# Patient Record
Sex: FEMALE | Race: WHITE | NOT HISPANIC OR LATINO | Employment: PART TIME | URBAN - METROPOLITAN AREA
[De-identification: names, ages, dates, MRNs, and addresses within clinical notes are randomized per-mention and may not be internally consistent; named-entity substitution may affect disease eponyms.]

---

## 2022-06-06 DIAGNOSIS — C50.919 MALIGNANT NEOPLASM OF BREAST IN FEMALE, ESTROGEN RECEPTOR POSITIVE, UNSPECIFIED LATERALITY, UNSPECIFIED SITE OF BREAST (MULTI): Primary | ICD-10-CM

## 2022-06-06 DIAGNOSIS — Z17.0 MALIGNANT NEOPLASM OF BREAST IN FEMALE, ESTROGEN RECEPTOR POSITIVE, UNSPECIFIED LATERALITY, UNSPECIFIED SITE OF BREAST (MULTI): Primary | ICD-10-CM

## 2023-03-20 LAB
ACTIVATED PARTIAL THROMBOPLASTIN TIME IN PPP BY COAGULATION ASSAY: 30 SEC (ref 26–39)
ALANINE AMINOTRANSFERASE (SGPT) (U/L) IN SER/PLAS: 14 U/L (ref 7–45)
ALBUMIN (G/DL) IN SER/PLAS: 4.5 G/DL (ref 3.4–5)
ALKALINE PHOSPHATASE (U/L) IN SER/PLAS: 71 U/L (ref 33–136)
ANION GAP IN SER/PLAS: 12 MMOL/L (ref 10–20)
ASPARTATE AMINOTRANSFERASE (SGOT) (U/L) IN SER/PLAS: 15 U/L (ref 9–39)
BASOPHILS (10*3/UL) IN BLOOD BY AUTOMATED COUNT: 0.03 X10E9/L (ref 0–0.1)
BASOPHILS/100 LEUKOCYTES IN BLOOD BY AUTOMATED COUNT: 0.6 % (ref 0–2)
BILIRUBIN TOTAL (MG/DL) IN SER/PLAS: 0.7 MG/DL (ref 0–1.2)
CALCIUM (MG/DL) IN SER/PLAS: 10.1 MG/DL (ref 8.6–10.6)
CARBON DIOXIDE, TOTAL (MMOL/L) IN SER/PLAS: 29 MMOL/L (ref 21–32)
CHLORIDE (MMOL/L) IN SER/PLAS: 101 MMOL/L (ref 98–107)
CREATININE (MG/DL) IN SER/PLAS: 0.62 MG/DL (ref 0.5–1.05)
EOSINOPHILS (10*3/UL) IN BLOOD BY AUTOMATED COUNT: 0.07 X10E9/L (ref 0–0.7)
EOSINOPHILS/100 LEUKOCYTES IN BLOOD BY AUTOMATED COUNT: 1.5 % (ref 0–6)
ERYTHROCYTE DISTRIBUTION WIDTH (RATIO) BY AUTOMATED COUNT: 13.3 % (ref 11.5–14.5)
ERYTHROCYTE MEAN CORPUSCULAR HEMOGLOBIN CONCENTRATION (G/DL) BY AUTOMATED: 33.8 G/DL (ref 32–36)
ERYTHROCYTE MEAN CORPUSCULAR VOLUME (FL) BY AUTOMATED COUNT: 100 FL (ref 80–100)
ERYTHROCYTES (10*6/UL) IN BLOOD BY AUTOMATED COUNT: 4.01 X10E12/L (ref 4–5.2)
GFR FEMALE: >90 ML/MIN/1.73M2
GLUCOSE (MG/DL) IN SER/PLAS: 136 MG/DL (ref 74–99)
HEMATOCRIT (%) IN BLOOD BY AUTOMATED COUNT: 40.2 % (ref 36–46)
HEMOGLOBIN (G/DL) IN BLOOD: 13.6 G/DL (ref 12–16)
IMMATURE GRANULOCYTES/100 LEUKOCYTES IN BLOOD BY AUTOMATED COUNT: 0 % (ref 0–0.9)
INR IN PPP BY COAGULATION ASSAY: 1 (ref 0.9–1.1)
LEUKOCYTES (10*3/UL) IN BLOOD BY AUTOMATED COUNT: 4.8 X10E9/L (ref 4.4–11.3)
LYMPHOCYTES (10*3/UL) IN BLOOD BY AUTOMATED COUNT: 0.77 X10E9/L (ref 1.2–4.8)
LYMPHOCYTES/100 LEUKOCYTES IN BLOOD BY AUTOMATED COUNT: 16 % (ref 13–44)
MONOCYTES (10*3/UL) IN BLOOD BY AUTOMATED COUNT: 0.34 X10E9/L (ref 0.1–1)
MONOCYTES/100 LEUKOCYTES IN BLOOD BY AUTOMATED COUNT: 7.1 % (ref 2–10)
NEUTROPHILS (10*3/UL) IN BLOOD BY AUTOMATED COUNT: 3.59 X10E9/L (ref 1.2–7.7)
NEUTROPHILS/100 LEUKOCYTES IN BLOOD BY AUTOMATED COUNT: 74.8 % (ref 40–80)
PLATELETS (10*3/UL) IN BLOOD AUTOMATED COUNT: 162 X10E9/L (ref 150–450)
POTASSIUM (MMOL/L) IN SER/PLAS: 3.8 MMOL/L (ref 3.5–5.3)
PROTEIN TOTAL: 6.9 G/DL (ref 6.4–8.2)
PROTHROMBIN TIME (PT) IN PPP BY COAGULATION ASSAY: 11.7 SEC (ref 9.8–13.4)
SODIUM (MMOL/L) IN SER/PLAS: 138 MMOL/L (ref 136–145)
UREA NITROGEN (MG/DL) IN SER/PLAS: 8 MG/DL (ref 6–23)

## 2023-09-12 LAB
ALANINE AMINOTRANSFERASE (SGPT) (U/L) IN SER/PLAS: 15 U/L (ref 7–45)
ALBUMIN (G/DL) IN SER/PLAS: 4.7 G/DL (ref 3.4–5)
ALKALINE PHOSPHATASE (U/L) IN SER/PLAS: 69 U/L (ref 33–136)
ANION GAP IN SER/PLAS: 16 MMOL/L (ref 10–20)
ASPARTATE AMINOTRANSFERASE (SGOT) (U/L) IN SER/PLAS: 17 U/L (ref 9–39)
BASOPHILS (10*3/UL) IN BLOOD BY AUTOMATED COUNT: 0.04 X10E9/L (ref 0–0.1)
BASOPHILS/100 LEUKOCYTES IN BLOOD BY AUTOMATED COUNT: 0.8 % (ref 0–2)
BILIRUBIN TOTAL (MG/DL) IN SER/PLAS: 0.5 MG/DL (ref 0–1.2)
CALCIUM (MG/DL) IN SER/PLAS: 9.9 MG/DL (ref 8.6–10.6)
CARBON DIOXIDE, TOTAL (MMOL/L) IN SER/PLAS: 28 MMOL/L (ref 21–32)
CHLORIDE (MMOL/L) IN SER/PLAS: 104 MMOL/L (ref 98–107)
CREATININE (MG/DL) IN SER/PLAS: 0.55 MG/DL (ref 0.5–1.05)
EOSINOPHILS (10*3/UL) IN BLOOD BY AUTOMATED COUNT: 0.09 X10E9/L (ref 0–0.7)
EOSINOPHILS/100 LEUKOCYTES IN BLOOD BY AUTOMATED COUNT: 1.9 % (ref 0–6)
ERYTHROCYTE DISTRIBUTION WIDTH (RATIO) BY AUTOMATED COUNT: 12.4 % (ref 11.5–14.5)
ERYTHROCYTE MEAN CORPUSCULAR HEMOGLOBIN CONCENTRATION (G/DL) BY AUTOMATED: 32.5 G/DL (ref 32–36)
ERYTHROCYTE MEAN CORPUSCULAR VOLUME (FL) BY AUTOMATED COUNT: 105 FL (ref 80–100)
ERYTHROCYTES (10*6/UL) IN BLOOD BY AUTOMATED COUNT: 4.33 X10E12/L (ref 4–5.2)
GFR FEMALE: >90 ML/MIN/1.73M2
GLUCOSE (MG/DL) IN SER/PLAS: 50 MG/DL (ref 74–99)
HEMATOCRIT (%) IN BLOOD BY AUTOMATED COUNT: 45.5 % (ref 36–46)
HEMOGLOBIN (G/DL) IN BLOOD: 14.8 G/DL (ref 12–16)
IMMATURE GRANULOCYTES/100 LEUKOCYTES IN BLOOD BY AUTOMATED COUNT: 0 % (ref 0–0.9)
LEUKOCYTES (10*3/UL) IN BLOOD BY AUTOMATED COUNT: 4.9 X10E9/L (ref 4.4–11.3)
LYMPHOCYTES (10*3/UL) IN BLOOD BY AUTOMATED COUNT: 1.29 X10E9/L (ref 1.2–4.8)
LYMPHOCYTES/100 LEUKOCYTES IN BLOOD BY AUTOMATED COUNT: 26.5 % (ref 13–44)
MONOCYTES (10*3/UL) IN BLOOD BY AUTOMATED COUNT: 0.35 X10E9/L (ref 0.1–1)
MONOCYTES/100 LEUKOCYTES IN BLOOD BY AUTOMATED COUNT: 7.2 % (ref 2–10)
NEUTROPHILS (10*3/UL) IN BLOOD BY AUTOMATED COUNT: 3.09 X10E9/L (ref 1.2–7.7)
NEUTROPHILS/100 LEUKOCYTES IN BLOOD BY AUTOMATED COUNT: 63.6 % (ref 40–80)
NRBC (PER 100 WBCS) BY AUTOMATED COUNT: 0 /100 WBC (ref 0–0)
PLATELETS (10*3/UL) IN BLOOD AUTOMATED COUNT: 218 X10E9/L (ref 150–450)
POTASSIUM (MMOL/L) IN SER/PLAS: 4.4 MMOL/L (ref 3.5–5.3)
PROTEIN TOTAL: 7.3 G/DL (ref 6.4–8.2)
SODIUM (MMOL/L) IN SER/PLAS: 144 MMOL/L (ref 136–145)
UREA NITROGEN (MG/DL) IN SER/PLAS: 14 MG/DL (ref 6–23)

## 2023-09-26 VITALS — BODY MASS INDEX: 22.58 KG/M2 | WEIGHT: 132.28 LBS | HEIGHT: 64 IN

## 2023-09-26 DIAGNOSIS — Z17.0 MALIGNANT NEOPLASM OF BREAST IN FEMALE, ESTROGEN RECEPTOR POSITIVE, UNSPECIFIED LATERALITY, UNSPECIFIED SITE OF BREAST (MULTI): Primary | ICD-10-CM

## 2023-09-26 DIAGNOSIS — C50.919 MALIGNANT NEOPLASM OF BREAST IN FEMALE, ESTROGEN RECEPTOR POSITIVE, UNSPECIFIED LATERALITY, UNSPECIFIED SITE OF BREAST (MULTI): Primary | ICD-10-CM

## 2023-09-26 RX ORDER — HEPARIN SODIUM,PORCINE/PF 10 UNIT/ML
50 SYRINGE (ML) INTRAVENOUS AS NEEDED
OUTPATIENT
Start: 2023-09-30

## 2023-09-26 RX ORDER — HEPARIN 100 UNIT/ML
500 SYRINGE INTRAVENOUS AS NEEDED
OUTPATIENT
Start: 2023-09-30

## 2023-11-09 ENCOUNTER — NURSE TRIAGE (OUTPATIENT)
Dept: ADMISSION | Facility: HOSPITAL | Age: 63
End: 2023-11-09
Payer: MEDICARE

## 2023-11-09 NOTE — TELEPHONE ENCOUNTER
Patient c/o lightheadedness/wooziness (not dizziness per patient) that occurs maybe 3-4 times a week for the past couple of weeks.   She notices it when she is up doing some sort of activity. She is an active person. States this feeling is mild but she wanted to report it.   Patient is on a clinical trial; she takes oral Giredestrant. She has been taking this for about a year. She has not had any dizziness in the past.   Next FUV 12/11.     States her glucose in September was 50 after eating. Her PCP advised her to just notify her of her next level; she did not do any further workup/provide any intervention. She has not had labs since. She currently denies any signs of hypoglycemia and has never been diagnosed with diabetes. She has been diligent eating fruit/trying to maintain sugar in her diet. She also drinks a lot of water per patient. No issues eating or drinking/no weight loss.   Additional Information   Commented on: Do you/patient have any of these signs of a stroke?     Denies any of these   Commented on: How long have you been having this problem?     At least 2-3 weeks    Protocols used: Dizziness

## 2023-11-10 NOTE — TELEPHONE ENCOUNTER
After reviewing with Carolyn Bishop, I advised Eloina to contact her PCP to further work up since she has had issues with low blood sugars. Patient verbalized understanding and has no further questions or concerns at this time.

## 2023-11-30 ENCOUNTER — LAB (OUTPATIENT)
Dept: LAB | Facility: CLINIC | Age: 63
End: 2023-11-30
Payer: COMMERCIAL

## 2023-11-30 DIAGNOSIS — C50.919 MALIGNANT NEOPLASM OF BREAST IN FEMALE, ESTROGEN RECEPTOR POSITIVE, UNSPECIFIED LATERALITY, UNSPECIFIED SITE OF BREAST (MULTI): ICD-10-CM

## 2023-11-30 DIAGNOSIS — Z17.0 MALIGNANT NEOPLASM OF BREAST IN FEMALE, ESTROGEN RECEPTOR POSITIVE, UNSPECIFIED LATERALITY, UNSPECIFIED SITE OF BREAST (MULTI): ICD-10-CM

## 2023-11-30 LAB
BASOPHILS # BLD AUTO: 0.03 X10*3/UL (ref 0–0.1)
BASOPHILS NFR BLD AUTO: 0.8 %
EOSINOPHIL # BLD AUTO: 0.05 X10*3/UL (ref 0–0.7)
EOSINOPHIL NFR BLD AUTO: 1.3 %
ERYTHROCYTE [DISTWIDTH] IN BLOOD BY AUTOMATED COUNT: 12.9 % (ref 11.5–14.5)
HCT VFR BLD AUTO: 43.9 % (ref 36–46)
HGB BLD-MCNC: 14.7 G/DL (ref 12–16)
IMM GRANULOCYTES # BLD AUTO: 0 X10*3/UL (ref 0–0.7)
IMM GRANULOCYTES NFR BLD AUTO: 0 % (ref 0–0.9)
LYMPHOCYTES # BLD AUTO: 1.12 X10*3/UL (ref 1.2–4.8)
LYMPHOCYTES NFR BLD AUTO: 28.8 %
MCH RBC QN AUTO: 33.2 PG (ref 26–34)
MCHC RBC AUTO-ENTMCNC: 33.5 G/DL (ref 32–36)
MCV RBC AUTO: 99 FL (ref 80–100)
MONOCYTES # BLD AUTO: 0.33 X10*3/UL (ref 0.1–1)
MONOCYTES NFR BLD AUTO: 8.5 %
NEUTROPHILS # BLD AUTO: 2.36 X10*3/UL (ref 1.2–7.7)
NEUTROPHILS NFR BLD AUTO: 60.6 %
NRBC BLD-RTO: ABNORMAL /100{WBCS}
PLATELET # BLD AUTO: 219 X10*3/UL (ref 150–450)
RBC # BLD AUTO: 4.43 X10*6/UL (ref 4–5.2)
WBC # BLD AUTO: 3.9 X10*3/UL (ref 4.4–11.3)

## 2023-11-30 PROCEDURE — 36415 COLL VENOUS BLD VENIPUNCTURE: CPT

## 2023-11-30 PROCEDURE — 80053 COMPREHEN METABOLIC PANEL: CPT

## 2023-11-30 PROCEDURE — 85025 COMPLETE CBC W/AUTO DIFF WBC: CPT

## 2023-12-01 LAB
ALBUMIN SERPL BCP-MCNC: 4.7 G/DL (ref 3.4–5)
ALP SERPL-CCNC: 64 U/L (ref 33–136)
ALT SERPL W P-5'-P-CCNC: 19 U/L (ref 7–45)
ANION GAP SERPL CALC-SCNC: 12 MMOL/L (ref 10–20)
AST SERPL W P-5'-P-CCNC: 16 U/L (ref 9–39)
BILIRUB SERPL-MCNC: 0.7 MG/DL (ref 0–1.2)
BUN SERPL-MCNC: 10 MG/DL (ref 6–23)
CALCIUM SERPL-MCNC: 9.8 MG/DL (ref 8.6–10.6)
CHLORIDE SERPL-SCNC: 102 MMOL/L (ref 98–107)
CO2 SERPL-SCNC: 29 MMOL/L (ref 21–32)
CREAT SERPL-MCNC: 0.64 MG/DL (ref 0.5–1.05)
GFR SERPL CREATININE-BSD FRML MDRD: >90 ML/MIN/1.73M*2
GLUCOSE SERPL-MCNC: 62 MG/DL (ref 74–99)
POTASSIUM SERPL-SCNC: 4.1 MMOL/L (ref 3.5–5.3)
PROT SERPL-MCNC: 7.1 G/DL (ref 6.4–8.2)
SODIUM SERPL-SCNC: 139 MMOL/L (ref 136–145)

## 2023-12-04 PROBLEM — R00.2 PALPITATION: Status: ACTIVE | Noted: 2017-02-24

## 2023-12-04 PROBLEM — Z86.19 HISTORY OF HPV INFECTION: Status: ACTIVE | Noted: 2017-08-17

## 2023-12-04 PROBLEM — M81.0 OSTEOPOROSIS: Status: ACTIVE | Noted: 2023-09-07

## 2023-12-04 PROBLEM — Z98.890 S/P ABLATION OF ATRIAL FLUTTER: Status: ACTIVE | Noted: 2017-02-24

## 2023-12-04 PROBLEM — C50.911 MALIGNANT NEOPLASM OF UNSPECIFIED SITE OF RIGHT FEMALE BREAST (MULTI): Status: ACTIVE | Noted: 2023-12-04

## 2023-12-04 PROBLEM — C44.619 BASAL CELL CARCINOMA (BCC) OF SKIN OF LEFT UPPER EXTREMITY INCLUDING SHOULDER: Status: ACTIVE | Noted: 2021-05-13

## 2023-12-04 PROBLEM — N60.19 DIFFUSE CYSTIC MASTOPATHY: Status: ACTIVE | Noted: 2023-12-04

## 2023-12-04 PROBLEM — C50.911: Status: ACTIVE | Noted: 2023-12-04

## 2023-12-04 PROBLEM — M54.50 LUMBAGO: Status: ACTIVE | Noted: 2023-12-04

## 2023-12-04 PROBLEM — Z86.79 S/P ABLATION OF ATRIAL FLUTTER: Status: ACTIVE | Noted: 2017-02-24

## 2023-12-04 RX ORDER — CHOLECALCIFEROL (VITAMIN D3) 50 MCG
1 TABLET ORAL DAILY
COMMUNITY
End: 2023-12-11 | Stop reason: SDUPTHER

## 2023-12-04 RX ORDER — ERYTHROMYCIN 5 MG/G
OINTMENT OPHTHALMIC
COMMUNITY
Start: 2023-01-03 | End: 2023-12-11 | Stop reason: ALTCHOICE

## 2023-12-04 RX ORDER — MULTIVITAMIN WITH IRON
TABLET ORAL
COMMUNITY
Start: 2022-05-09 | End: 2024-01-12 | Stop reason: WASHOUT

## 2023-12-04 RX ORDER — ACETAMINOPHEN 500 MG
2000 TABLET ORAL
COMMUNITY

## 2023-12-04 RX ORDER — ZINC/VIT C/PYRIDOXINE (VIT B6) 12-60-0.5
LOZENGE ORAL
COMMUNITY
End: 2023-12-11 | Stop reason: SDUPTHER

## 2023-12-04 RX ORDER — EPINEPHRINE 0.22MG
AEROSOL WITH ADAPTER (ML) INHALATION
COMMUNITY
Start: 2022-05-09 | End: 2023-12-11 | Stop reason: ALTCHOICE

## 2023-12-04 RX ORDER — CALCIUM CARBONATE/VITAMIN D3 500 MG-10
TABLET,CHEWABLE ORAL
COMMUNITY
End: 2023-12-11 | Stop reason: SDUPTHER

## 2023-12-04 RX ORDER — MILK THISTLE FRUIT EXTRACT 140 MG
CAPSULE ORAL
COMMUNITY
Start: 2022-05-09 | End: 2023-12-11 | Stop reason: SDUPTHER

## 2023-12-04 RX ORDER — DEXTROMETHORPHAN HYDROBROMIDE, GUAIFENESIN 5; 100 MG/5ML; MG/5ML
650 LIQUID ORAL EVERY 8 HOURS PRN
COMMUNITY

## 2023-12-04 RX ORDER — BIOTIN 1 MG
1 TABLET ORAL DAILY
COMMUNITY

## 2023-12-04 RX ORDER — CALCIUM CARBONATE 1250 MG/5ML
SUSPENSION ORAL
COMMUNITY
End: 2024-01-12 | Stop reason: WASHOUT

## 2023-12-04 RX ORDER — MULTIVIT-MIN/IRON/FOLIC ACID/K 18-600-40
CAPSULE ORAL
COMMUNITY
End: 2023-12-11 | Stop reason: SDUPTHER

## 2023-12-04 RX ORDER — ZINC GLUCONATE 50 MG
TABLET ORAL
COMMUNITY
Start: 2022-05-09 | End: 2023-12-11 | Stop reason: SDUPTHER

## 2023-12-11 ENCOUNTER — APPOINTMENT (OUTPATIENT)
Dept: HEMATOLOGY/ONCOLOGY | Facility: CLINIC | Age: 63
End: 2023-12-11

## 2023-12-11 ENCOUNTER — OFFICE VISIT (OUTPATIENT)
Dept: HEMATOLOGY/ONCOLOGY | Facility: CLINIC | Age: 63
End: 2023-12-11
Payer: MEDICARE

## 2023-12-11 VITALS
SYSTOLIC BLOOD PRESSURE: 112 MMHG | BODY MASS INDEX: 21.45 KG/M2 | DIASTOLIC BLOOD PRESSURE: 69 MMHG | WEIGHT: 133.49 LBS | TEMPERATURE: 96.3 F | OXYGEN SATURATION: 99 % | RESPIRATION RATE: 12 BRPM | HEIGHT: 66 IN | HEART RATE: 65 BPM

## 2023-12-11 DIAGNOSIS — Z17.0 MALIGNANT NEOPLASM OF BREAST IN FEMALE, ESTROGEN RECEPTOR POSITIVE, UNSPECIFIED LATERALITY, UNSPECIFIED SITE OF BREAST (MULTI): ICD-10-CM

## 2023-12-11 DIAGNOSIS — C50.919 MALIGNANT NEOPLASM OF BREAST IN FEMALE, ESTROGEN RECEPTOR POSITIVE, UNSPECIFIED LATERALITY, UNSPECIFIED SITE OF BREAST (MULTI): ICD-10-CM

## 2023-12-11 PROCEDURE — 99215 OFFICE O/P EST HI 40 MIN: CPT | Performed by: STUDENT IN AN ORGANIZED HEALTH CARE EDUCATION/TRAINING PROGRAM

## 2023-12-11 PROCEDURE — 1159F MED LIST DOCD IN RCRD: CPT | Performed by: STUDENT IN AN ORGANIZED HEALTH CARE EDUCATION/TRAINING PROGRAM

## 2023-12-11 PROCEDURE — 1126F AMNT PAIN NOTED NONE PRSNT: CPT | Performed by: STUDENT IN AN ORGANIZED HEALTH CARE EDUCATION/TRAINING PROGRAM

## 2023-12-11 ASSESSMENT — ENCOUNTER SYMPTOMS
OCCASIONAL FEELINGS OF UNSTEADINESS: 0
DEPRESSION: 0

## 2023-12-11 ASSESSMENT — PATIENT HEALTH QUESTIONNAIRE - PHQ9
1. LITTLE INTEREST OR PLEASURE IN DOING THINGS: NOT AT ALL
2. FEELING DOWN, DEPRESSED OR HOPELESS: NOT AT ALL
SUM OF ALL RESPONSES TO PHQ9 QUESTIONS 1 & 2: 0

## 2023-12-11 ASSESSMENT — PAIN SCALES - GENERAL: PAINLEVEL: 0-NO PAIN

## 2023-12-11 ASSESSMENT — LIFESTYLE VARIABLES
HOW OFTEN DO YOU HAVE SIX OR MORE DRINKS ON ONE OCCASION: NEVER
AUDIT-C TOTAL SCORE: 1
HOW OFTEN DO YOU HAVE A DRINK CONTAINING ALCOHOL: MONTHLY OR LESS
SKIP TO QUESTIONS 9-10: 1
HOW MANY STANDARD DRINKS CONTAINING ALCOHOL DO YOU HAVE ON A TYPICAL DAY: 1 OR 2

## 2023-12-11 NOTE — PROGRESS NOTES
[facility]  Location: [place]    Visit Type: Follow-up Visit    Oncologic History:    3/4/21: Screening Mammo: extremely dense tissue. Right breast calcifications.    3/30/21: Diagnostic B breast Continue yearly mammogram.: large marina like calcifications in lower inner quadrant of right breast.    9/30/21: R breast diagnostic Continue yearly mammogram.: Stable R breast calcifications   4/12/22: Bilateral diagnostic mammogram with targeted right breast US: R breast at 6:00 position 2 CFN, there is a hypoechoic mass, measuring 1.5 X 0.7 X2.5 cm corresponding to mammogram findings. Scanning of right axilla demonstrates no adenopathy. No  evidence of malignancy in the left breast.    4/21/22: R breast US: guided core needle biopsy:    4/28.22:  review of outside images: biopsy proven malignancy at the 6:00 position of the R breast spanning approximately 2.5 cm.    5/20/22: R breast upper inner quadrant MRI guided biopsy    6/8/22: R breast total mastectomy with axillary sentinel LN biopsy. IDC with DCIS. 1.4 cm. 4 negative LN. G3. LVI present. negative margins. pT1cN0. ER positive % OH positive 81-90% HER2 negative. MammaPrint High risk, luminal B type.  -0.686.    8/2/22- 10/4/22: Adjuvant TC X4   11/2022: Enrolled on NSABP B61 trial- randomized to giradestrant arm    Subjective: Interval History    Reports she believes she may be having side effects from the treatment.     She has had two CMPs with hypoglycemia. Reports no symptoms at the time. No prior history of recurrent/pathologic hypoglycemia. She has been referred to endocrinology.     Reports slight dizziness for 6 weeks. Comes and goes. Episodes come on about 3-4 times a week. Last several seconds then resolve. No associated chest pain, nausea, SOB, tinnitus. Does not believe it is with standing from sitting. She reports being well hydrated. Denies new medications or supplements. She will be on a holter monitor starting  "tomorrow given history of atrial flutter.     Mild stable arthralgias.     Denies weight loss, changes in the breast and/or chest wall, new aches or pains, changes in appetite or energy level.       Gynecologic History:     Menarche 11  Menopause 52  , 25 years old first delivery  Did not BF  No OCP or HRT  Used Clomid     Pertinent Family history:    No family history of breast or ovarian cancer  Sister had uterine and cervical cancer  Mother with lung cancer    Social History  Eloina Mejia  reports that she has never smoked. She has never used smokeless tobacco.  She  reports current alcohol use.  She  has no history on file for drug use.    ROS:     Review of Systems   All other systems reviewed and are negative.       Physical Examination:    /69   Pulse 65   Temp 35.7 °C (96.3 °F)   Resp 12   Ht 1.673 m (5' 5.87\")   Wt 60.6 kg (133 lb 7.8 oz)   SpO2 99%   BMI 21.63 kg/m²     Physical Exam  Vitals and nursing note reviewed.   Constitutional:       General: She is not in acute distress.     Appearance: Normal appearance. She is not toxic-appearing.   HENT:      Head: Normocephalic and atraumatic.      Mouth/Throat:      Pharynx: Oropharynx is clear.   Eyes:      Extraocular Movements: Extraocular movements intact.      Conjunctiva/sclera: Conjunctivae normal.   Cardiovascular:      Rate and Rhythm: Normal rate and regular rhythm.   Pulmonary:      Effort: Pulmonary effort is normal. No respiratory distress.   Abdominal:      General: Abdomen is flat. Bowel sounds are normal.      Palpations: Abdomen is soft.   Musculoskeletal:         General: No swelling. Normal range of motion.      Cervical back: Normal range of motion.   Skin:     General: Skin is warm and dry.   Neurological:      General: No focal deficit present.      Mental Status: She is alert.   Psychiatric:         Mood and Affect: Mood normal.         Breast Examination:    R chest wall without masses/lesions.   L breast  is " unremarkable.     ECOG Performance Status:     [x] 0 Fully active, able to carry on all pre-disease performance without restriction  [] 1 Restricted in physically strenuous activity but ambulatory and able to carry out work of a light or sedentary nature, e.g., light house work, office work  [] 2 Ambulatory and capable of all selfcare but unable to carry out any work activities; up and about more than 50% of waking hours  [] 3 Capable of only limited selfcare; confined to bed or chair more than 50% of waking hours  [] 4 Completely disabled; cannot carry on any selfcare; totally confined to bed or chair  [] 5 Dead     Results:    Labs:  Reviewed    Imaging:  Reviewed    Pathology:  Reviewed    Assessment:     Pathologic prognostic stage IA (pT1c pN0 cM0) invasive ductal carcinoma of the R breast; Dx in April 2022; Grade 3; ER positive (%), OK positive (81-90%),  HER2 negative; High risk MammaPrint; S/p R mastectomy and SLNbx. S/p adjuvant TCX4. On giredestrant on NSABP B61 trial.     Eloina is a very pleasant post-menopausal woman with other history of HTN and IBS. There is no evidence of breast cancer recurrence based on her clinical exam today. Experiencing 6 weeks of intermittent, very transient, dizziness and 2 episodes of asymptomatic hypoglycemia. There is a possibility these may be related to Giredestrant. Will do a 3 week trial off drug to assess for resolution of symptoms. Holter monitor planned. If work-up and trial off is unrevealing, will look into other etiologies.       Plan:    Surgical Plan: S/p R mastectomy and SLNbx  Additional biopsy: Not indicated  Radiation Plan: Not indicated  Additional staging scans/DEXA/echo: Staging scans not indicated based on current stage, patient history and physical examination. Dexa from 8/2021 with evidence of osteoporosis  Additional Path info (i.e Ki67, PDL1): Not indicated  Gene assays: MammaPrint high risk     Systemic treatment plan: Giredestrant 3 week trial  off                   Intent: Curative             Clinical trial: Enrolled on [id number]: “A Phase III, Randomized, Open-Label, Multicenter Study Evaluating The Efficacy And Safety  Of Adjuvant Giredestrant Compared With Physician's Choice Of Adjuvant Endocrine Monotherapy In Patients With Estrogen Receptor-Positive, Her2- Negative Early Breast Cancer”                 Endocrine therapy: Trial with Giredestrant                HER2 treatment: Not indicated                Targeted agents: Not indicated                Chemotherapy: TC x4 completed 10/4/22                BMA: Receiving zometa every 6 months for 3 years. C2 7/10/23.     Access: Not indicated  Supportive meds: not indicated  Genetic testing: Not indicated  Fertility preservation: Not indicated  Other active problems/orders:      Dizziness: Experiencing 6 weeks of intermittent, very transient, dizziness. There is a possibility these may be related to Giredestrant. Will do a 3 week trial off drug to assess for resolution of symptoms. Holter monitor planned. If work-up and trial off is unrevealing, will look into other etiologies.   2 episodes of asymptomatic hypoglycemia: on CMP. She has been referred to endocrinology by her PCP.   Osteoporosis: On zometa - general recommendations for bone loss prevention which include 9939-8210 mg of calcium intake per day for adults  >50yrs, and no history of renal calculi; 800-1000 IU of vitamin D3 per day for adults >50yrs, and no history of renal calculi. Weight bearing exercise. Discontinue smoking. Avoid excessive use of caffeine, soft drinks, and alcoholic beverages. In addition,  balance training and fall prevention programs can help reduce the risk of fractures. Next DEXA due in 8/2023, monitored by PCP.     Surveillance plan: Yearly mammogram of L breast    Follow-up:  per trial protocol. She will call after 3 week trial off of giredestrant with updates.     Patient expressed understanding of the plan outlined  above. She had ample time to ask questions. She understands she can contact us should she have additional questions or issues arise in the interim.

## 2023-12-11 NOTE — RESEARCH NOTES
Patient in clinic today to see Dr. Kamara for C15 D1 of Giredestrant. She remains on clinical trial [id number], ARM A. She complains of intermittent dizziness, grade 1. States it happens 3-4x per week and always while she's standing, but doesn't change with activity while standing. It last seconds at a time. Otherwise she is still complaining of grade 1 hot flashes and body aches that have not changed from her last visit. Per Dr. Kamara, patient will hold Giredestrant for 3 weeks to determine if dizziness resolves. She did return her old pill bottles which were returned to pharmacy. Pill diaries collected. Patient did receive new supply of Giredestrant with the understanding that she should not start until she follows up by phone with research RN. She verbalized understanding. New pill diaries dispensed. Plan to RTC in 12 weeks with labs, research labs, and ECG at that time per protocol.

## 2023-12-12 DIAGNOSIS — E16.2 HYPOGLYCEMIA: Primary | ICD-10-CM

## 2023-12-22 DIAGNOSIS — C50.919 MALIGNANT NEOPLASM OF BREAST IN FEMALE, ESTROGEN RECEPTOR POSITIVE, UNSPECIFIED LATERALITY, UNSPECIFIED SITE OF BREAST (MULTI): Primary | ICD-10-CM

## 2023-12-22 DIAGNOSIS — Z17.0 MALIGNANT NEOPLASM OF BREAST IN FEMALE, ESTROGEN RECEPTOR POSITIVE, UNSPECIFIED LATERALITY, UNSPECIFIED SITE OF BREAST (MULTI): Primary | ICD-10-CM

## 2023-12-27 ENCOUNTER — LAB (OUTPATIENT)
Dept: LAB | Facility: LAB | Age: 63
End: 2023-12-27
Payer: COMMERCIAL

## 2023-12-27 DIAGNOSIS — E16.2 HYPOGLYCEMIA: ICD-10-CM

## 2023-12-27 LAB
ALBUMIN SERPL BCP-MCNC: 4.2 G/DL (ref 3.4–5)
ALP SERPL-CCNC: 53 U/L (ref 33–136)
ALT SERPL W P-5'-P-CCNC: 16 U/L (ref 7–45)
ANION GAP SERPL CALC-SCNC: 12 MMOL/L (ref 10–20)
AST SERPL W P-5'-P-CCNC: 18 U/L (ref 9–39)
BILIRUB SERPL-MCNC: 0.4 MG/DL (ref 0–1.2)
BUN SERPL-MCNC: 6 MG/DL (ref 6–23)
CALCIUM SERPL-MCNC: 9.7 MG/DL (ref 8.6–10.3)
CHLORIDE SERPL-SCNC: 100 MMOL/L (ref 98–107)
CO2 SERPL-SCNC: 25 MMOL/L (ref 21–32)
CREAT SERPL-MCNC: 0.56 MG/DL (ref 0.5–1.05)
GFR SERPL CREATININE-BSD FRML MDRD: >90 ML/MIN/1.73M*2
GLUCOSE SERPL-MCNC: 83 MG/DL (ref 74–99)
POTASSIUM SERPL-SCNC: 4.1 MMOL/L (ref 3.5–5.3)
PROT SERPL-MCNC: 7.3 G/DL (ref 6.4–8.2)
SODIUM SERPL-SCNC: 133 MMOL/L (ref 136–145)

## 2023-12-27 PROCEDURE — 36415 COLL VENOUS BLD VENIPUNCTURE: CPT

## 2023-12-27 PROCEDURE — 80053 COMPREHEN METABOLIC PANEL: CPT

## 2023-12-28 ENCOUNTER — TELEPHONE (OUTPATIENT)
Dept: HEMATOLOGY/ONCOLOGY | Facility: CLINIC | Age: 63
End: 2023-12-28
Payer: MEDICARE

## 2024-01-08 ENCOUNTER — OFFICE VISIT (OUTPATIENT)
Dept: HEMATOLOGY/ONCOLOGY | Facility: CLINIC | Age: 64
End: 2024-01-08
Payer: COMMERCIAL

## 2024-01-08 VITALS
BODY MASS INDEX: 21.28 KG/M2 | RESPIRATION RATE: 14 BRPM | TEMPERATURE: 97 F | OXYGEN SATURATION: 99 % | SYSTOLIC BLOOD PRESSURE: 120 MMHG | DIASTOLIC BLOOD PRESSURE: 78 MMHG | WEIGHT: 132.39 LBS | HEIGHT: 66 IN | HEART RATE: 64 BPM

## 2024-01-08 DIAGNOSIS — C50.911 ADENOCARCINOMA OF BREAST, RIGHT (MULTI): ICD-10-CM

## 2024-01-08 PROBLEM — Z90.79 ACQUIRED ABSENCE OF GENITAL ORGANS: Status: ACTIVE | Noted: 2023-01-10

## 2024-01-08 PROBLEM — R00.2 PALPITATIONS: Status: ACTIVE | Noted: 2017-02-24

## 2024-01-08 PROBLEM — Z71.89 OTHER SPECIFIED COUNSELING: Status: ACTIVE | Noted: 2023-09-11

## 2024-01-08 PROBLEM — Z85.3 HISTORY OF MALIGNANT NEOPLASM OF BREAST: Status: ACTIVE | Noted: 2024-01-08

## 2024-01-08 PROBLEM — Z86.19 HISTORY OF INFECTION DUE TO HUMAN PAPILLOMA VIRUS (HPV): Status: ACTIVE | Noted: 2017-08-17

## 2024-01-08 PROBLEM — Z85.3 PERSONAL HISTORY OF MALIGNANT NEOPLASM OF BREAST: Status: ACTIVE | Noted: 2023-01-10

## 2024-01-08 PROBLEM — M54.50 LOW BACK PAIN: Status: ACTIVE | Noted: 2023-12-04

## 2024-01-08 PROBLEM — Z86.79 HISTORY OF ATRIAL FLUTTER: Status: ACTIVE | Noted: 2017-02-24

## 2024-01-08 PROBLEM — N60.19 FIBROCYSTIC BREAST CHANGES: Status: ACTIVE | Noted: 2023-12-04

## 2024-01-08 PROBLEM — M81.0 OSTEOPOROSIS: Status: ACTIVE | Noted: 2023-07-10

## 2024-01-08 PROBLEM — C44.611 BASAL CELL CARCINOMA (BCC) OF UPPER EXTREMITY: Status: ACTIVE | Noted: 2021-05-13

## 2024-01-08 PROCEDURE — 1159F MED LIST DOCD IN RCRD: CPT | Performed by: STUDENT IN AN ORGANIZED HEALTH CARE EDUCATION/TRAINING PROGRAM

## 2024-01-08 PROCEDURE — 99215 OFFICE O/P EST HI 40 MIN: CPT | Performed by: STUDENT IN AN ORGANIZED HEALTH CARE EDUCATION/TRAINING PROGRAM

## 2024-01-08 PROCEDURE — 1126F AMNT PAIN NOTED NONE PRSNT: CPT | Performed by: STUDENT IN AN ORGANIZED HEALTH CARE EDUCATION/TRAINING PROGRAM

## 2024-01-08 RX ORDER — CALCIUM/MAGNESIUM 300-300 MG
TABLET ORAL
COMMUNITY
Start: 2022-05-09 | End: 2024-01-12 | Stop reason: WASHOUT

## 2024-01-08 RX ORDER — ZINC/VIT C/PYRIDOXINE (VIT B6) 12-60-0.5
LOZENGE ORAL
COMMUNITY
End: 2024-01-12 | Stop reason: WASHOUT

## 2024-01-08 RX ORDER — NIRMATRELVIR AND RITONAVIR 300-100 MG
KIT ORAL
COMMUNITY
Start: 2023-12-16 | End: 2024-01-12 | Stop reason: WASHOUT

## 2024-01-08 ASSESSMENT — PAIN SCALES - GENERAL: PAINLEVEL: 0-NO PAIN

## 2024-01-08 NOTE — PROGRESS NOTES
[facility]  Location: [place]    Visit Type: Follow-up Visit    Oncologic History:    3/4/21: Screening Mammo: extremely dense tissue. Right breast calcifications.    3/30/21: Diagnostic B breast Continue yearly mammogram.: large marina like calcifications in lower inner quadrant of right breast.    21: R breast diagnostic Continue yearly mammogram.: Stable R breast calcifications   22: Bilateral diagnostic mammogram with targeted right breast US: R breast at 6:00 position 2 CFN, there is a hypoechoic mass, measuring 1.5 X 0.7 X2.5 cm corresponding to mammogram findings. Scanning of right axilla demonstrates no adenopathy. No  evidence of malignancy in the left breast.    22: R breast US: guided core needle biopsy:    .22:  review of outside images: biopsy proven malignancy at the 6:00 position of the R breast spanning approximately 2.5 cm.    22: R breast upper inner quadrant MRI guided biopsy    22: R breast total mastectomy with axillary sentinel LN biopsy. IDC with DCIS. 1.4 cm. 4 negative LN. G3. LVI present. negative margins. pT1cN0. ER positive % AK positive 81-90% HER2 negative. MammaPrint High risk, luminal B type.  -0.686.    22- 10/4/22: Adjuvant TC X4   2022: Enrolled on NSABP B61 trial- randomized to giradestrant arm    Subjective: Interval History    Stopped Giredestrant for 3 weeks. Reports resolution of dizziness and improvement in arthralgias. She also had a CMP which did not show hypoglycemia.     She feels the dizziness may have been related to something else but is unsure. Her  had similar complaints at the time.     She resumed giredestrant 1/2 and has not had recurrence of symptoms.    Feels the arthralgias and hot flashes at this time are manageable.     Gynecologic History:     Menarche 11  Menopause 52  , 25 years old first delivery  Did not BF  No OCP or HRT  Used Clomid     Pertinent Family history:    No family  "history of breast or ovarian cancer  Sister had uterine and cervical cancer  Mother with lung cancer    Social History  Eloina Mejia  reports that she has never smoked. She has never used smokeless tobacco.  She  reports current alcohol use.  She  has no history on file for drug use.    ROS:     Review of Systems   All other systems reviewed and are negative.       Physical Examination:    /78   Pulse 64   Temp 36.1 °C (97 °F)   Resp 14   Ht 1.677 m (5' 6.02\")   Wt 60.1 kg (132 lb 6.2 oz)   SpO2 99%   BMI 21.35 kg/m²     Physical Exam  Vitals and nursing note reviewed.   Constitutional:       General: She is not in acute distress.     Appearance: Normal appearance. She is not toxic-appearing.   HENT:      Head: Normocephalic and atraumatic.      Mouth/Throat:      Pharynx: Oropharynx is clear.   Eyes:      Extraocular Movements: Extraocular movements intact.      Conjunctiva/sclera: Conjunctivae normal.   Cardiovascular:      Rate and Rhythm: Normal rate and regular rhythm.   Pulmonary:      Effort: Pulmonary effort is normal. No respiratory distress.   Abdominal:      General: Abdomen is flat. Bowel sounds are normal.      Palpations: Abdomen is soft.   Musculoskeletal:         General: No swelling. Normal range of motion.      Cervical back: Normal range of motion.   Skin:     General: Skin is warm and dry.   Neurological:      General: No focal deficit present.      Mental Status: She is alert.   Psychiatric:         Mood and Affect: Mood normal.       Breast Examination:    Deferred this visit    R chest wall without masses/lesions.   L breast  is unremarkable.     ECOG Performance Status:     [x] 0 Fully active, able to carry on all pre-disease performance without restriction  [] 1 Restricted in physically strenuous activity but ambulatory and able to carry out work of a light or sedentary nature, e.g., light house work, office work  [] 2 Ambulatory and capable of all selfcare but unable to carry " out any work activities; up and about more than 50% of waking hours  [] 3 Capable of only limited selfcare; confined to bed or chair more than 50% of waking hours  [] 4 Completely disabled; cannot carry on any selfcare; totally confined to bed or chair  [] 5 Dead     Results:    Labs:  Reviewed    Imaging:  Reviewed    Pathology:  Reviewed    Assessment:     Pathologic prognostic stage IA (pT1c pN0 cM0) invasive ductal carcinoma of the R breast; Dx in April 2022; Grade 3; ER positive (%), SD positive (81-90%),  HER2 negative; High risk MammaPrint; S/p R mastectomy and SLNbx. S/p adjuvant TCX4. On giredestrant on NSABP B61 trial.     Eloina is a very pleasant post-menopausal woman with other history of HTN and IBS. She has resumed Giredestrant without recurrence of dizziness. She will see endocrinology for asymptomatic hypoglycemia however, this was seem incidentally on two CMPs, I am not sure of the significance and if related to treatment.     Plan:    Surgical Plan: S/p R mastectomy and SLNbx  Additional biopsy: Not indicated  Radiation Plan: Not indicated  Additional staging scans/DEXA/echo: Staging scans not indicated based on current stage, patient history and physical examination. Dexa from 8/2021 with evidence of osteoporosis  Additional Path info (i.e Ki67, PDL1): Not indicated  Gene assays: MammaPrint high risk     Systemic treatment plan: Giredestrant                Intent: Curative             Clinical trial: Enrolled on [id number]: “A Phase III, Randomized, Open-Label, Multicenter Study Evaluating The Efficacy And Safety  Of Adjuvant Giredestrant Compared With Physician's Choice Of Adjuvant Endocrine Monotherapy In Patients With Estrogen Receptor-Positive, Her2- Negative Early Breast Cancer”                 Endocrine therapy: Trial with Giredestrant                HER2 treatment: Not indicated                Targeted agents: Not indicated                Chemotherapy: TC x4 completed 10/4/22                 BMA: Receiving zometa every 6 months for 3 years. C2 7/10/23.     Access: Not indicated  Supportive meds: not indicated  Genetic testing: Not indicated  Fertility preservation: Not indicated  Other active problems/orders:      Dizziness: Resolved Experiencing 6 weeks of intermittent, very transient, dizziness. There is a possibility these may be related to Giredestrant. Will do a 3 week trial off drug to assess for resolution of symptoms. Holter monitor planned. If work-up and trial off is unrevealing, will look into other etiologies.   2 episodes of asymptomatic hypoglycemia: on CMP. She has been referred to endocrinology by her PCP.   Osteoporosis: On zometa - general recommendations for bone loss prevention which include 5800-5884 mg of calcium intake per day for adults  >50yrs, and no history of renal calculi; 800-1000 IU of vitamin D3 per day for adults >50yrs, and no history of renal calculi. Weight bearing exercise. Discontinue smoking. Avoid excessive use of caffeine, soft drinks, and alcoholic beverages. In addition,  balance training and fall prevention programs can help reduce the risk of fractures. Next DEXA due in 8/2023, monitored by PCP.     Surveillance plan: Yearly mammogram of L breast    Follow-up: Per trial protocol. Will reschedule Zometa for 2/21.    Patient expressed understanding of the plan outlined above. She had ample time to ask questions. She understands she can contact us should she have additional questions or issues arise in the interim.

## 2024-01-11 NOTE — PROGRESS NOTES
Eloina Mejia female   1960 63 y.o.   [id number]      Chief Complaint    Follow-up          HPI  Eloina Mejia is a 63 y.o.  2022 Right breast invasive poorly differentiated ductal carcinoma with lobular features,   ER +%, IN +91-90%, HER2 equivocal, negative by FISH. cT2N0  2022 Right breast ductal carcinoma in situ, intermediate nuclear grade with apocrine features, ER negative, IN negative.    Eloina Mejia is a 61 yo female presenting to the [facility] for routine follow up. She was diagnosed at outside facility with screen detected right breast poorly differentiated invasive ductal carcinoma, ER/IN positive, HER2 equivocal, negative by FISH. She underwent right breast total mastectomy, axillary sentinel lymph node biopsy on 2022. Final pathology demonstrated 1.4cm invasive ductal carcinoma and ductal carcinoma in situ, grade 3, suspicious for lymphatic vessel invasion, negative margins, 4 axillary sentinel lymph nodes negative for carcinoma (0/4), pT1c N0 M0. . Mammaprint -0.686, high risk luminal B.    She completed adjuvant chemotherapy TC x 4 2022 - 10/4/2022 under direction of Dr. Kamara. She enrolled in NSABP B61 trial in 2022 on giredestrant orally daily.    BREAST IMAGIN2023 Fast breast MRI, BI-RADS Category 1. 1/10/2023 left diagnostic mammogram, BI-RADS Category 1.     BREAST PROCEDURE: 2022 Right breast ultrasound guided core biopsy at 6:00, 2cm from nipple ([facility]). 2022 Right breast upper inner quadrant MRI guided biopsy [facility] 2022 Right breast total mastectomy with axillary sentinel lymph node biopsy ([facility]).    REPRODUCTIVE HEALTH: menarche at 11, , age first birth 25, no OCPs post menopausal estrogen, or history of breast feeding, menopause age 52    MEDICAL HISTORY: paroxysmal a fib/flutter s/p flutter ablation, osteoporosis, h/o BCC    FAMILY CANCER HISTORY: Mother diagnosed with lung cancer. Sister diagnosed  with uterine cancer and cervical cancer, no breast or ovarian cancers    MEDICAL ONCOLOGY: Dr. Kamara: adjuvant chemotherapy TC x4 from 8/2/2022 -10/4/2022. Enrolled in NSABP B61 trial - randomized to giradestrant arm.      REVIEW OF SYSTEMS    Constitutional:  Negative for appetite change, fatigue, fever and unexpected weight change.   HENT:  Negative for ear pain, hearing loss, nosebleeds, sore throat and trouble swallowing.    Eyes:  Negative for discharge, itching and visual disturbance.   Respiratory:  Negative for cough, chest tightness and shortness of breath.    Cardiovascular:  Negative for chest pain, palpitations and leg swelling.   Breast: as indicated in HPI  Gastrointestinal:  Negative for abdominal pain, constipation, diarrhea and nausea.   Endocrine: Negative for cold intolerance and heat intolerance.   Genitourinary:  Negative for dysuria, frequency, hematuria, pelvic pain and vaginal bleeding.   Musculoskeletal:  Negative for arthralgias, back pain, gait problem, joint swelling and myalgias.   Skin:  Negative for color change and rash.   Allergic/Immunologic: Negative for environmental allergies and food allergies.   Neurological:  Negative for dizziness, tremors, speech difficulty, weakness, numbness and headaches.   Hematological:  Does not bruise/bleed easily.   Psychiatric/Behavioral:  Negative for agitation, dysphoric mood and sleep disturbance. The patient is not nervous/anxious.         MEDICATIONS  Current Outpatient Medications   Medication Instructions    acetaminophen (Tylenol 8 Hour) 650 mg ER tablet oral    biotin 1 mg tablet 1 tablet, oral, Daily    calcium-minerals-D3-K2-silicon 200 mg calcium- 200 unit tablet Vit Calcium Citrate + D Active    cholecalciferol (VITAMIN D-3) 2,000 Units, oral    Study [id number] giredestrant 30 mg capsule 30 mg    Study [id number] giredestrant 30 mg, oral, Daily, Take approximately the same time every day; can be taken with or without food; take with a  full glass of water.        ALLERGIES  No Known Allergies     SOCIAL HISTORY    No family history on file.      Social History     Tobacco Use    Smoking status: Never    Smokeless tobacco: Never   Substance Use Topics    Alcohol use: Yes        VITALS  Vitals:    01/12/24 0957   BP: 118/79   Pulse: 63        PHYSICAL EXAM  Patient is alert and oriented x3, with appropriate mood. The gait is steady and hand grasps are equal. Sclera clear. The right breast is removed with healed mastectomy incision. The left breast tissue is soft without palpable abnormalities, discrete nodules or masses. The skin and nipples appear normal. There is no cervical, supraclavicular, or axillary lymphadenopathy palpable. Heart rate and rhythm normal, S1 and S2 appreciated. The lungs are clear bilaterally. Abdomen is soft & non-tender.    Physical Exam  Chest:              IMAGING  BI mammo left screening tomosynthesis 01/12/2024    Narrative  Interpreted By:  Radha Waite,  STUDY:  BI MAMMO LEFT SCREENING TOMOSYNTHESIS;  1/12/2024 9:52 am      FINDINGS:  2D and tomosynthesis images were reviewed at 1 mm slice thickness.    Density:  The breast tissue is heterogeneously dense, which may  obscure small masses.    No suspicious masses or calcifications are identified.    Impression  No mammographic evidence of malignancy.    BI-RADS Category:  1 Negative.  Recommendation:  Routine Screening Mammogram in 1 Year.  Recommended Date:  1 Year.  Laterality:  Bilateral.          Time was spent viewing digital images of the radiology testing with the patient. I explained the results in depth, along with suggested explanation for follow up recommendations based on the testing results.          ORDERS  Orders Placed This Encounter   Procedures    BI mammo left screening tomosynthesis     Standing Status:   Future     Standing Expiration Date:   3/11/2026     Order Specific Question:   Reason for exam:     Answer:   clinic screen     Order Specific  Question:   Radiologist to Determine Optimal Study     Answer:   Yes     Order Specific Question:   Release result to MyChart     Answer:   Immediate [1]     Order Specific Question:   Is this exam part of a Research Study? If Yes, link this order to the research study     Answer:   No    MR breast bilateral w IV contrast fast screening self pay     Standing Status:   Future     Standing Expiration Date:   1/12/2025     Order Specific Question:   Reason for exam:     Answer:   .     Order Specific Question:   What is the patient's sedation requirement?     Answer:   No Sedation     Order Specific Question:   Does the patient have a Cochlear Implant, Pacemaker, Defibrillator, Pacing Wire, Brain Aneurysm Clip, Implanted Nerve or Bone Graft Simulator, Implanted Breast Tissue Expander, Glucose Monitor or Neulasta Device?     Answer:   No     Order Specific Question:   Radiologist to Determine Optimal Study     Answer:   Yes     Order Specific Question:   Release result to [employer]     Answer:   Immediate     Order Specific Question:   Is this exam part of a Research Study? If Yes, link this order to the research study     Answer:   No           ASSESSMENT/PLAN  1. Encounter for follow-up surveillance of breast cancer  Clinic Appointment Request    MR breast bilateral w IV contrast fast screening self pay      2. Healthcare maintenance  BI mammo left screening tomosynthesis           Follow up in about 1 year (around 1/12/2025), or with left screening mammogram. Fast MRI summer 2024 (requests).       Alexandria Quispe, [facility]

## 2024-01-12 ENCOUNTER — APPOINTMENT (OUTPATIENT)
Dept: RADIOLOGY | Facility: CLINIC | Age: 64
End: 2024-01-12
Payer: COMMERCIAL

## 2024-01-12 ENCOUNTER — OFFICE VISIT (OUTPATIENT)
Dept: SURGICAL ONCOLOGY | Facility: CLINIC | Age: 64
End: 2024-01-12
Payer: COMMERCIAL

## 2024-01-12 ENCOUNTER — ANCILLARY PROCEDURE (OUTPATIENT)
Dept: RADIOLOGY | Facility: CLINIC | Age: 64
End: 2024-01-12
Payer: COMMERCIAL

## 2024-01-12 VITALS
DIASTOLIC BLOOD PRESSURE: 79 MMHG | WEIGHT: 132.6 LBS | HEART RATE: 63 BPM | BODY MASS INDEX: 21.39 KG/M2 | SYSTOLIC BLOOD PRESSURE: 118 MMHG

## 2024-01-12 DIAGNOSIS — Z08 ENCOUNTER FOR FOLLOW-UP SURVEILLANCE OF BREAST CANCER: Primary | ICD-10-CM

## 2024-01-12 DIAGNOSIS — Z12.31 ENCOUNTER FOR SCREENING MAMMOGRAM FOR MALIGNANT NEOPLASM OF BREAST: ICD-10-CM

## 2024-01-12 DIAGNOSIS — Z00.00 HEALTHCARE MAINTENANCE: ICD-10-CM

## 2024-01-12 DIAGNOSIS — Z85.3 ENCOUNTER FOR FOLLOW-UP SURVEILLANCE OF BREAST CANCER: Primary | ICD-10-CM

## 2024-01-12 PROCEDURE — 1126F AMNT PAIN NOTED NONE PRSNT: CPT | Performed by: NURSE PRACTITIONER

## 2024-01-12 PROCEDURE — 77063 BREAST TOMOSYNTHESIS BI: CPT | Mod: LEFT SIDE | Performed by: RADIOLOGY

## 2024-01-12 PROCEDURE — 99214 OFFICE O/P EST MOD 30 MIN: CPT | Performed by: NURSE PRACTITIONER

## 2024-01-12 PROCEDURE — 77067 SCR MAMMO BI INCL CAD: CPT | Mod: LEFT SIDE | Performed by: RADIOLOGY

## 2024-01-12 PROCEDURE — 77067 SCR MAMMO BI INCL CAD: CPT | Mod: LT

## 2024-01-12 PROCEDURE — 1159F MED LIST DOCD IN RCRD: CPT | Performed by: NURSE PRACTITIONER

## 2024-01-12 ASSESSMENT — PAIN SCALES - GENERAL: PAINLEVEL: 0-NO PAIN

## 2024-02-19 DIAGNOSIS — C50.911 MALIGNANT NEOPLASM OF RIGHT BREAST IN FEMALE, ESTROGEN RECEPTOR POSITIVE, UNSPECIFIED SITE OF BREAST (MULTI): Primary | ICD-10-CM

## 2024-02-19 DIAGNOSIS — Z17.0 MALIGNANT NEOPLASM OF RIGHT BREAST IN FEMALE, ESTROGEN RECEPTOR POSITIVE, UNSPECIFIED SITE OF BREAST (MULTI): Primary | ICD-10-CM

## 2024-02-20 ENCOUNTER — LAB (OUTPATIENT)
Dept: LAB | Facility: CLINIC | Age: 64
End: 2024-02-20
Payer: MEDICARE

## 2024-02-20 ENCOUNTER — APPOINTMENT (OUTPATIENT)
Dept: LAB | Facility: CLINIC | Age: 64
End: 2024-02-20
Payer: MEDICARE

## 2024-02-20 DIAGNOSIS — Z17.0 MALIGNANT NEOPLASM OF RIGHT BREAST IN FEMALE, ESTROGEN RECEPTOR POSITIVE, UNSPECIFIED SITE OF BREAST (MULTI): Primary | ICD-10-CM

## 2024-02-20 DIAGNOSIS — C50.911 MALIGNANT NEOPLASM OF RIGHT BREAST IN FEMALE, ESTROGEN RECEPTOR POSITIVE, UNSPECIFIED SITE OF BREAST (MULTI): Primary | ICD-10-CM

## 2024-02-20 DIAGNOSIS — Z17.0 MALIGNANT NEOPLASM OF RIGHT BREAST IN FEMALE, ESTROGEN RECEPTOR POSITIVE, UNSPECIFIED SITE OF BREAST (MULTI): ICD-10-CM

## 2024-02-20 DIAGNOSIS — C50.911 MALIGNANT NEOPLASM OF RIGHT BREAST IN FEMALE, ESTROGEN RECEPTOR POSITIVE, UNSPECIFIED SITE OF BREAST (MULTI): ICD-10-CM

## 2024-02-20 LAB
BASOPHILS # BLD AUTO: 0.04 X10*3/UL (ref 0–0.1)
BASOPHILS NFR BLD AUTO: 0.9 %
EOSINOPHIL # BLD AUTO: 0.1 X10*3/UL (ref 0–0.7)
EOSINOPHIL NFR BLD AUTO: 2.3 %
ERYTHROCYTE [DISTWIDTH] IN BLOOD BY AUTOMATED COUNT: 13.6 % (ref 11.5–14.5)
HCT VFR BLD AUTO: 41.8 % (ref 36–46)
HGB BLD-MCNC: 14 G/DL (ref 12–16)
IMM GRANULOCYTES # BLD AUTO: 0 X10*3/UL (ref 0–0.7)
IMM GRANULOCYTES NFR BLD AUTO: 0 % (ref 0–0.9)
LYMPHOCYTES # BLD AUTO: 1.26 X10*3/UL (ref 1.2–4.8)
LYMPHOCYTES NFR BLD AUTO: 29.4 %
MCH RBC QN AUTO: 33.6 PG (ref 26–34)
MCHC RBC AUTO-ENTMCNC: 33.5 G/DL (ref 32–36)
MCV RBC AUTO: 100 FL (ref 80–100)
MONOCYTES # BLD AUTO: 0.25 X10*3/UL (ref 0.1–1)
MONOCYTES NFR BLD AUTO: 5.8 %
NEUTROPHILS # BLD AUTO: 2.64 X10*3/UL (ref 1.2–7.7)
NEUTROPHILS NFR BLD AUTO: 61.6 %
NRBC BLD-RTO: ABNORMAL /100{WBCS}
PLATELET # BLD AUTO: 200 X10*3/UL (ref 150–450)
RBC # BLD AUTO: 4.17 X10*6/UL (ref 4–5.2)
WBC # BLD AUTO: 4.3 X10*3/UL (ref 4.4–11.3)

## 2024-02-20 PROCEDURE — 84075 ASSAY ALKALINE PHOSPHATASE: CPT | Performed by: STUDENT IN AN ORGANIZED HEALTH CARE EDUCATION/TRAINING PROGRAM

## 2024-02-20 PROCEDURE — 85025 COMPLETE CBC W/AUTO DIFF WBC: CPT

## 2024-02-20 PROCEDURE — 36415 COLL VENOUS BLD VENIPUNCTURE: CPT

## 2024-02-20 RX ORDER — ALBUTEROL SULFATE 0.83 MG/ML
3 SOLUTION RESPIRATORY (INHALATION) AS NEEDED
Status: CANCELLED | OUTPATIENT
Start: 2024-02-21

## 2024-02-20 RX ORDER — EPINEPHRINE 0.3 MG/.3ML
0.3 INJECTION SUBCUTANEOUS EVERY 5 MIN PRN
Status: CANCELLED | OUTPATIENT
Start: 2024-02-21

## 2024-02-20 RX ORDER — DIPHENHYDRAMINE HYDROCHLORIDE 50 MG/ML
50 INJECTION INTRAMUSCULAR; INTRAVENOUS AS NEEDED
Status: CANCELLED | OUTPATIENT
Start: 2024-02-21

## 2024-02-20 RX ORDER — FAMOTIDINE 10 MG/ML
20 INJECTION INTRAVENOUS ONCE AS NEEDED
Status: CANCELLED | OUTPATIENT
Start: 2024-02-21

## 2024-02-21 ENCOUNTER — INFUSION (OUTPATIENT)
Dept: HEMATOLOGY/ONCOLOGY | Facility: CLINIC | Age: 64
End: 2024-02-21
Payer: MEDICARE

## 2024-02-21 VITALS
WEIGHT: 137.02 LBS | OXYGEN SATURATION: 99 % | RESPIRATION RATE: 16 BRPM | HEART RATE: 63 BPM | BODY MASS INDEX: 22.1 KG/M2 | DIASTOLIC BLOOD PRESSURE: 81 MMHG | TEMPERATURE: 96.8 F | SYSTOLIC BLOOD PRESSURE: 130 MMHG

## 2024-02-21 DIAGNOSIS — Z17.0 MALIGNANT NEOPLASM OF RIGHT BREAST IN FEMALE, ESTROGEN RECEPTOR POSITIVE, UNSPECIFIED SITE OF BREAST (MULTI): ICD-10-CM

## 2024-02-21 DIAGNOSIS — C50.911 MALIGNANT NEOPLASM OF RIGHT BREAST IN FEMALE, ESTROGEN RECEPTOR POSITIVE, UNSPECIFIED SITE OF BREAST (MULTI): ICD-10-CM

## 2024-02-21 DIAGNOSIS — C50.911 ADENOCARCINOMA OF BREAST, RIGHT (MULTI): ICD-10-CM

## 2024-02-21 LAB
ALBUMIN SERPL BCP-MCNC: 4.5 G/DL (ref 3.4–5)
ALP SERPL-CCNC: 71 U/L (ref 33–136)
ALT SERPL W P-5'-P-CCNC: 21 U/L (ref 7–45)
ANION GAP SERPL CALC-SCNC: 13 MMOL/L (ref 10–20)
AST SERPL W P-5'-P-CCNC: 17 U/L (ref 9–39)
BILIRUB SERPL-MCNC: 0.4 MG/DL (ref 0–1.2)
BUN SERPL-MCNC: 12 MG/DL (ref 6–23)
CALCIUM SERPL-MCNC: 9.9 MG/DL (ref 8.6–10.6)
CHLORIDE SERPL-SCNC: 104 MMOL/L (ref 98–107)
CO2 SERPL-SCNC: 29 MMOL/L (ref 21–32)
CREAT SERPL-MCNC: 0.58 MG/DL (ref 0.5–1.05)
EGFRCR SERPLBLD CKD-EPI 2021: >90 ML/MIN/1.73M*2
GLUCOSE SERPL-MCNC: 87 MG/DL (ref 74–99)
POTASSIUM SERPL-SCNC: 4.3 MMOL/L (ref 3.5–5.3)
PROT SERPL-MCNC: 6.8 G/DL (ref 6.4–8.2)
SODIUM SERPL-SCNC: 142 MMOL/L (ref 136–145)

## 2024-02-21 PROCEDURE — 2500000004 HC RX 250 GENERAL PHARMACY W/ HCPCS (ALT 636 FOR OP/ED): Performed by: STUDENT IN AN ORGANIZED HEALTH CARE EDUCATION/TRAINING PROGRAM

## 2024-02-21 PROCEDURE — 96365 THER/PROPH/DIAG IV INF INIT: CPT | Mod: INF

## 2024-02-21 PROCEDURE — 96367 TX/PROPH/DG ADDL SEQ IV INF: CPT

## 2024-02-21 RX ORDER — FAMOTIDINE 10 MG/ML
20 INJECTION INTRAVENOUS ONCE AS NEEDED
OUTPATIENT
Start: 2024-08-04

## 2024-02-21 RX ORDER — FAMOTIDINE 10 MG/ML
20 INJECTION INTRAVENOUS ONCE AS NEEDED
Status: DISCONTINUED | OUTPATIENT
Start: 2024-02-21 | End: 2024-02-21 | Stop reason: HOSPADM

## 2024-02-21 RX ORDER — EPINEPHRINE 0.3 MG/.3ML
0.3 INJECTION SUBCUTANEOUS EVERY 5 MIN PRN
OUTPATIENT
Start: 2024-08-04

## 2024-02-21 RX ORDER — DIPHENHYDRAMINE HYDROCHLORIDE 50 MG/ML
50 INJECTION INTRAMUSCULAR; INTRAVENOUS AS NEEDED
OUTPATIENT
Start: 2024-08-04

## 2024-02-21 RX ORDER — EPINEPHRINE 0.3 MG/.3ML
0.3 INJECTION SUBCUTANEOUS EVERY 5 MIN PRN
Status: DISCONTINUED | OUTPATIENT
Start: 2024-02-21 | End: 2024-02-21 | Stop reason: HOSPADM

## 2024-02-21 RX ORDER — ALBUTEROL SULFATE 0.83 MG/ML
3 SOLUTION RESPIRATORY (INHALATION) AS NEEDED
Status: DISCONTINUED | OUTPATIENT
Start: 2024-02-21 | End: 2024-02-21 | Stop reason: HOSPADM

## 2024-02-21 RX ORDER — ALBUTEROL SULFATE 0.83 MG/ML
3 SOLUTION RESPIRATORY (INHALATION) AS NEEDED
OUTPATIENT
Start: 2024-08-04

## 2024-02-21 RX ORDER — DIPHENHYDRAMINE HYDROCHLORIDE 50 MG/ML
50 INJECTION INTRAMUSCULAR; INTRAVENOUS AS NEEDED
Status: DISCONTINUED | OUTPATIENT
Start: 2024-02-21 | End: 2024-02-21 | Stop reason: HOSPADM

## 2024-02-21 RX ADMIN — SODIUM CHLORIDE 500 ML: 9 INJECTION, SOLUTION INTRAVENOUS at 10:35

## 2024-02-21 RX ADMIN — ZOLEDRONIC ACID 4 MG: 4 INJECTION, SOLUTION, CONCENTRATE INTRAVENOUS at 10:36

## 2024-02-21 ASSESSMENT — PAIN SCALES - GENERAL: PAINLEVEL: 0-NO PAIN

## 2024-02-21 NOTE — RESEARCH NOTES
Educated patient and her  on reconsent for [id number]. Reconsent signed. Copy given to patient. Advised to call [facility] with any further questions or concerns.

## 2024-02-22 ENCOUNTER — HOSPITAL ENCOUNTER (OUTPATIENT)
Facility: HOSPITAL | Age: 64
Setting detail: OUTPATIENT SURGERY
End: 2024-02-22
Payer: MEDICARE

## 2024-02-22 DIAGNOSIS — M65.332 TRIGGER FINGER, LEFT MIDDLE FINGER: ICD-10-CM

## 2024-02-22 DIAGNOSIS — M65.312 TRIGGER THUMB OF LEFT HAND: Primary | ICD-10-CM

## 2024-03-07 ENCOUNTER — DOCUMENTATION (OUTPATIENT)
Dept: RADIATION ONCOLOGY | Facility: CLINIC | Age: 64
End: 2024-03-07
Payer: MEDICARE

## 2024-03-08 ENCOUNTER — OFFICE VISIT (OUTPATIENT)
Dept: ENDOCRINOLOGY | Facility: CLINIC | Age: 64
End: 2024-03-08
Payer: MEDICARE

## 2024-03-08 VITALS
DIASTOLIC BLOOD PRESSURE: 71 MMHG | HEART RATE: 64 BPM | HEIGHT: 66 IN | BODY MASS INDEX: 21.53 KG/M2 | WEIGHT: 134 LBS | SYSTOLIC BLOOD PRESSURE: 119 MMHG

## 2024-03-08 DIAGNOSIS — E16.2 LOW BLOOD GLUCOSE MEASUREMENT: Primary | ICD-10-CM

## 2024-03-08 PROCEDURE — 1126F AMNT PAIN NOTED NONE PRSNT: CPT | Performed by: INTERNAL MEDICINE

## 2024-03-08 PROCEDURE — 99204 OFFICE O/P NEW MOD 45 MIN: CPT | Performed by: INTERNAL MEDICINE

## 2024-03-08 PROCEDURE — 1159F MED LIST DOCD IN RCRD: CPT | Performed by: INTERNAL MEDICINE

## 2024-03-08 PROCEDURE — 1160F RVW MEDS BY RX/DR IN RCRD: CPT | Performed by: INTERNAL MEDICINE

## 2024-03-08 ASSESSMENT — PAIN SCALES - GENERAL: PAINLEVEL: 0-NO PAIN

## 2024-03-08 NOTE — PROGRESS NOTES
Chief Complaint:  Low blood glucose    HPI  63 y.o.  4/21/2022 Right breast invasive poorly differentiated ductal carcinoma with lobular features,   ER +%, OH +91-90%, HER2 equivocal, negative by FISH. cT2N0  5/20/2022 Right breast ductal carcinoma in situ, intermediate nuclear grade with apocrine features, ER negative, OH negative.    She underwent right breast total mastectomy, axillary sentinel lymph node biopsy on 6/8/2022. Final pathology demonstrated 1.4cm invasive ductal carcinoma and ductal carcinoma in situ, grade 3, suspicious for lymphatic vessel invasion, negative margins, 4 axillary sentinel lymph nodes negative for carcinoma (0/4), pT1c N0 M0. . Mammaprint -0.686, high risk luminal B.     She completed adjuvant chemotherapy TC x 4 8/2/2022 - 10/4/2022 under direction of Dr. Kamara. She enrolled in NSABP B61 trial in 11/2022 on giredestrant orally daily.      Noted by PMD to have a low blood sugar level on 2 occasions. On neither of these instances did she have any sx to suggest hypoglycemia. In both instances the blood was drawn after eating. She denies any sx at any time to suggest hypoglycemia.; is not awaken at night to eat; stable weight; no increased appetite; following good diet schedule and also quality. Active has no other concerns    ROS:   Negative otherwise    Exam   Well appearing in no distress  Skin: soft; normal texture  EYES: normal EOM; normal fundi;    Neck: no bruit; normal thyroid exam  Normal reflexes  Negative Chvostek's; no tremors     Impression/ Plan    Low plasma Glucose (NOT HYPOGLYCEMIA) likely due to delay in sample processing. No need for any additional testing. Reassured; she is to call if she has any new sx related to this. She will get a finger stick at the same time she gets another blood test next time.    Urmila Arellano MD

## 2024-03-11 RX ORDER — ACETAMINOPHEN, DIPHENHYDRAMINE HCL, PHENYLEPHRINE HCL 325; 25; 5 MG/1; MG/1; MG/1
5 TABLET ORAL AS NEEDED
COMMUNITY

## 2024-03-11 ASSESSMENT — DUKE ACTIVITY SCORE INDEX (DASI)
CAN YOU RUN A SHORT DISTANCE: YES
TOTAL_SCORE: 58.2
CAN YOU WALK INDOORS, SUCH AS AROUND YOUR HOUSE: YES
DASI METS SCORE: 9.9
CAN YOU DO MODERATE WORK AROUND THE HOUSE LIKE VACUUMING, SWEEPING FLOORS OR CARRYING GROCERIES: YES
CAN YOU DO HEAVY WORK AROUND THE HOUSE LIKE SCRUBBING FLOORS OR LIFTING AND MOVING HEAVY FURNITURE: YES
CAN YOU DO YARD WORK LIKE RAKING LEAVES, WEEDING OR PUSHING A MOWER: YES
CAN YOU WALK A BLOCK OR TWO ON LEVEL GROUND: YES
CAN YOU PARTICIPATE IN STRENOUS SPORTS LIKE SWIMMING, SINGLES TENNIS, FOOTBALL, BASKETBALL, OR SKIING: YES
CAN YOU PARTICIPATE IN MODERATE RECREATIONAL ACTIVITIES LIKE GOLF, BOWLING, DANCING, DOUBLES TENNIS OR THROWING A BASEBALL OR FOOTBALL: YES
CAN YOU HAVE SEXUAL RELATIONS: YES
CAN YOU DO LIGHT WORK AROUND THE HOUSE LIKE DUSTING OR WASHING DISHES: YES
CAN YOU CLIMB A FLIGHT OF STAIRS OR WALK UP A HILL: YES
CAN YOU TAKE CARE OF YOURSELF (EAT, DRESS, BATHE, OR USE TOILET): YES

## 2024-03-11 ASSESSMENT — CHADS2 SCORE
CHADS2 SCORE: 0
HYPERTENSION: NO
AGE GREATER THAN OR EQUAL TO 75: NO
DIABETES: NO
CHF: NO
PRIOR STROKE OR TIA OR THROMBOEMBOLISM: NO

## 2024-03-11 ASSESSMENT — ACTIVITIES OF DAILY LIVING (ADL): ADL_SCORE: 0

## 2024-03-11 ASSESSMENT — LIFESTYLE VARIABLES: SMOKING_STATUS: NONSMOKER

## 2024-03-12 ENCOUNTER — PRE-ADMISSION TESTING (OUTPATIENT)
Dept: PREADMISSION TESTING | Facility: HOSPITAL | Age: 64
End: 2024-03-12
Payer: MEDICARE

## 2024-03-12 VITALS
OXYGEN SATURATION: 100 % | RESPIRATION RATE: 16 BRPM | DIASTOLIC BLOOD PRESSURE: 69 MMHG | TEMPERATURE: 97 F | WEIGHT: 136.24 LBS | SYSTOLIC BLOOD PRESSURE: 110 MMHG | HEART RATE: 68 BPM | BODY MASS INDEX: 21.9 KG/M2 | HEIGHT: 66 IN

## 2024-03-12 DIAGNOSIS — Z01.818 PRE-OP TESTING: Primary | ICD-10-CM

## 2024-03-12 DIAGNOSIS — M65.332 ACQUIRED TRIGGER FINGER OF LEFT MIDDLE FINGER: ICD-10-CM

## 2024-03-12 DIAGNOSIS — M65.312 TRIGGER FINGER OF LEFT THUMB: ICD-10-CM

## 2024-03-12 PROCEDURE — 99203 OFFICE O/P NEW LOW 30 MIN: CPT | Performed by: NURSE PRACTITIONER

## 2024-03-12 PROCEDURE — 93005 ELECTROCARDIOGRAM TRACING: CPT

## 2024-03-12 PROCEDURE — 93010 ELECTROCARDIOGRAM REPORT: CPT | Performed by: INTERNAL MEDICINE

## 2024-03-12 ASSESSMENT — CHADS2 SCORE: AGE GREATER THAN OR EQUAL TO 75: NO

## 2024-03-12 NOTE — PREPROCEDURE INSTRUCTIONS
Medication List            Accurate as of March 12, 2024 10:05 AM. Always use your most recent med list.                biotin 1 mg tablet  Medication Adjustments for Surgery: Stop 7 days before surgery     calcium-minerals-D3-K2-silicon 200 mg calcium- 200 unit tablet  Medication Adjustments for Surgery: Stop 7 days before surgery     cholecalciferol 50 mcg (2,000 unit) capsule  Commonly known as: Vitamin D-3  Medication Adjustments for Surgery: Stop 7 days before surgery     melatonin 10 mg tablet  Medication Adjustments for Surgery: Continue until night before surgery     Study [id number] giredestrant 30 mg capsule  Medication Adjustments for Surgery: Stop 7 days before surgery     Tylenol 8 Hour 650 mg ER tablet  Generic drug: acetaminophen  Medication Adjustments for Surgery: Other (Comment)  Notes to patient: May use the morning of surgery if needed                  PRE-OPERATIVE INSTRUCTIONS    You will receive notification one business day prior to your procedure to confirm your arrival time. It is important that you answer your phone and/or check your messages during this time. If you do not hear from the surgery center by 5 pm. the day before your procedure, please call [phone].     Please enter the building through the Outpatient entrance and take the elevator off the lobby to the 2nd floor then check in at the Outpatient Surgery desk on the 2nd floor.    INSTRUCTIONS:  Talk to your surgeon for instructions if you should stop your aspirin, blood thinner, or diabetes medicines.  DO NOT take any multivitamins or over the counter supplements for 7-10 days before surgery.  If not being admitted, you must have an adult immediately available to drive you home after surgery. We also highly recommend you have someone stay with you for the entire day and night of your surgery.  For children having surgery, a parent or legal guardian must accompany them to the surgery center. If this is not possible, please  call [phone] to make additional arrangements.  For adults who are unable to consent or make medical decisions for themselves, a legal guardian or Power of  must accompany them to the surgery center. If this is not possible, please call [phone] to make additional arrangements.  Wear comfortable, loose fitting clothing.  All jewelry and piercings must be removed. If you are unable to remove an item or have a dermal piercing, please be sure to tell the nurse when you arrive for surgery.  Nail polish and make-up must be removed.  Avoid smoking or consuming alcohol for 24 hours before surgery.  To help prevent infection, please take a shower/bath and wash your hair the night before and/or morning of surgery (or follow other specific bathing instructions provided).    Preoperative Fasting Guidelines    Why must I stop eating and drinking near surgery time?  With sedation, food or liquid in your stomach can enter your lungs causing serious complications  Increases nausea and vomiting    When do I need to stop eating and drinking before my surgery?  Do not eat any solid food after midnight the night before your surgery/procedure.  You may have up to TEN ounces of clear liquid until TWO hours before your instructed arrival time to the hospital.  This includes water, black tea/coffee, (no milk or cream) apple juice, and electrolyte drinks (Gatorade).   You may chew gum until TWO hours before your surgery/procedure    If you have any questions or concerns, please call Pre-Admission Testing at [phone].         Home Preoperative Antibacterial Shower with Chlorhexidine gluconate (CHG)     What is a home preoperative antibacterial shower?  This shower is a way of cleaning the skin with a germ killing solution before surgery. The solution contains chlorhexidine gluconate, commonly known as CHG. CHG is a skin cleanser with germ killing ability. Let your doctor know if you are allergic to  chlorhexidine.    Why do I need to take a preoperative antibacterial shower?  Skin is not sterile. It is best to try to make your skin as free of germs as possible before surgery. Proper cleansing with a germ killing soap before surgery can lower the number of germs on your skin. This helps to reduce the risk of infection at the surgical site. Following the instructions listed below will help you prepare your skin for surgery.    How do I use the solution?  Begin using your CHG soap the night before and again the morning of your procedure.   Do not shave the day before or day of surgery.  Remove all jewelry until after surgery. Take off rings and take out all body-piercing jewelry.  Wash your face and hair with normal soap and shampoo before you use the CHG soap.  Apply the CHG solution to a clean wet washcloth. Move away from the water to avoid premature rinsing of the CHG soap as you are applying. Firmly lather your entire body from the neck down. Do not use CHG on your face, eyes, ears, or genitals.   Pay special attention to the area where your incisions will be located.  Do not scrub your skin too hard.  It is important to allow the CHG soap to sit on your skin for 3-5 minutes.  Rinse the solution off your body completely. Do not wash with your normal soap after the CHG soap solution.  Pat yourself dry with a clean, soft towel.  Do not apply powders, lotions or deodorants as these might block how the CHG soap works.   Dress in clean clothing.  Be sure to sleep with clean, freshly laundered sheets.  Be aware that CHG can cause stains on fabric. Rinse your washcloth and other linens that have contact with CHG completely. Use only non-chlorine detergents to launder the items used.

## 2024-03-12 NOTE — CPM/PAT H&P
CPM/PAT Evaluation       Name: Eloina Mejia (Eloina Mejia)  /Age: 1960/63 y.o.     In-Person       Chief Complaint: left     HPI    62 yo female who has been having left hand weakness with limited ROM in thumb, middle and index fingers for few months now. Skin intact on this hand and no steroid injections thus far. She has been seen by hand surgeon who has scheduled her for left upper extremity soft tissue release surgery. Otherwise denies any recent illness, fever/chills, chest pains or shortness of breath.     Past Medical History:   Diagnosis Date    Atrial flutter (CMS/HCC)     Basal cell carcinoma     Breast cancer (CMS/HCC)     right    Low back pain     Palpitations     PONV (postoperative nausea and vomiting)      PCP: Dr. Cline @ [facility], [place], operated by [facility]  Hem/Onc: Dr. Kamara  Endo: Dr. Arellano  Cards: Dr. Mcintosh    Past Surgical History:   Procedure Laterality Date    BI US GUIDED BREAST LOCALIZATION AND BIOPSY RIGHT Right 2022    BI US GUIDED BREAST LOCALIZATION AND BIOPSY RIGHT 2022    CARDIAC ELECTROPHYSIOLOGY STUDY AND ABLATION      COLONOSCOPY      MASTECTOMY Right     with chemo    OTHER SURGICAL HISTORY  2022    Breast biopsy core needle    OTHER SURGICAL HISTORY  2022    Right mastectomy     Patient  has no history on file for sexual activity.    Family History   Problem Relation Name Age of Onset    Lung cancer Mother      Heart attack Father      Diabetes Sister      Uterine cancer Sister      Diabetes Brother         No Known Allergies    Prior to Admission medications    Medication Sig Start Date End Date Taking? Authorizing Provider   acetaminophen (Tylenol 8 Hour) 650 mg ER tablet Take 1 tablet (650 mg) by mouth every 8 hours if needed.    Historical Provider, MD   biotin 1 mg tablet Take 1 tablet (1 mg) by mouth once daily.    Historical Provider, MD   calcium-minerals-D3-K2-silicon 200 mg calcium- 200 unit tablet Vit Calcium Citrate + D Active    Historical Provider, MD    cholecalciferol (Vitamin D-3) 50 mcg (2,000 unit) capsule Take 1 capsule (50 mcg) by mouth.    Historical Provider, MD   melatonin 10 mg tablet Take 5 mg by mouth if needed.    Historical Provider, MD   Study [id number] giredestrant 30 mg capsule 30 mg    Historical Provider, MD DINESH ALVARES   Constitutional: Negative for fever, chills, or sweats   ENMT: Negative for nasal discharge, congestion, ear pain, mouth pain, throat pain   Respiratory: Negative for cough, wheezing, shortness of breath   Cardiac: Negative for chest pain, dyspnea on exertion; positive occasional palpitations     Gastrointestinal: Negative for nausea, vomiting, diarrhea, constipation, abdominal pain  Genitourinary: Negative for dysuria, flank pain, frequency, hematuria     Musculoskeletal: positive for decreased ROM, pain, weakness in left hand and in thumb, middle and index fingers    Neurological: Negative for dizziness, confusion, headache  Psychiatric: Negative for mood changes   Skin: Negative for itching, rash, ulcer    Hematologic/Lymph: Negative for bruising, easy bleeding  Allergic/Immunologic: Negative itching, sneezing, swelling      Physical Exam  HENT:      Head: Normocephalic.      Mouth/Throat:      Mouth: Mucous membranes are moist.   Eyes:      Extraocular Movements: Extraocular movements intact.   Cardiovascular:      Rate and Rhythm: Normal rate and regular rhythm.   Pulmonary:      Effort: Pulmonary effort is normal.      Breath sounds: Normal breath sounds.   Abdominal:      General: Abdomen is flat.      Palpations: Abdomen is soft.   Musculoskeletal:      Left hand: Decreased range of motion.      Cervical back: Normal range of motion.      Comments: Decreased ROM in left thumb, middle and index fingers   Skin:     General: Skin is warm and dry.   Neurological:      General: No focal deficit present.      Mental Status: She is alert.   Psychiatric:         Mood and Affect: Mood normal.        PAT AIRWAY:   Airway:      Neck ROM::  Full  normal      Anesthesia:  Patient endorses PONV    Visit Vitals  /69   Pulse 68   Temp 36.1 °C (97 °F) (Temporal)   Resp 16       DASI Risk Score      Flowsheet Row Most Recent Value   DASI SCORE 58.2   METS Score (Will be calculated only when all the questions are answered) 9.9          Caprini DVT Assessment      Flowsheet Row Most Recent Value   DVT Score 7   Current Status Present cancer or chemotherapy, Major surgery planned, including arthroscopic and laproscopic (1-2 hours)   Age 60-75 years   BMI 30 or less          Modified Frailty Index      Flowsheet Row Most Recent Value   Modified Frailty Index Calculator 0          CHADS2 Stroke Risk  Current as of 10 minutes ago        N/A 3 - 100%: High Risk   2 - 3%: Medium Risk   0 - 2%: Low Risk     Last Change: N/A          This score determines the patient's risk of having a stroke if the patient has atrial fibrillation.        This score is not applicable to this patient. Components are not calculated.          Revised Cardiac Risk Index    No data to display       Apfel Simplified Score    No data to display       Risk Analysis Index Results This Encounter         3/11/2024  1352             TREVINO Cancer History: Patient indicates history of cancer    Total Risk Analysis Index Score Without Cancer: 20    Total Risk Analysis Index Score: 34          Stop Bang Score      Flowsheet Row Most Recent Value   Do you snore loudly? 0   Do you often feel tired or fatigued after your sleep? 0   Has anyone ever observed you stop breathing in your sleep? 0   Do you have or are you being treated for high blood pressure? 0   Recent BMI (Calculated) 21.6   Is BMI greater than 35 kg/m2? 0=No   Age older than 50 years old? 1=Yes   Is your neck circumference greater than 17 inches (Male) or 16 inches (Female)? 0   Gender - Male 0=No   STOP-BANG Total Score 1            Assessment and Plan:     64 yo female scheduled for release of left upper extremity soft  tissue on 3/25/2024 with Dr. Garcia. EKG shows NSR with v rate of 67 bpm. Otherwise no further orders indicated.      See risk scores as previously documented.

## 2024-03-14 ENCOUNTER — LAB (OUTPATIENT)
Dept: LAB | Facility: LAB | Age: 64
End: 2024-03-14
Payer: MEDICARE

## 2024-03-14 DIAGNOSIS — Z17.0 MALIGNANT NEOPLASM OF BREAST IN FEMALE, ESTROGEN RECEPTOR POSITIVE, UNSPECIFIED LATERALITY, UNSPECIFIED SITE OF BREAST (MULTI): ICD-10-CM

## 2024-03-14 DIAGNOSIS — C50.919 MALIGNANT NEOPLASM OF BREAST IN FEMALE, ESTROGEN RECEPTOR POSITIVE, UNSPECIFIED LATERALITY, UNSPECIFIED SITE OF BREAST (MULTI): ICD-10-CM

## 2024-03-15 ENCOUNTER — LAB (OUTPATIENT)
Dept: LAB | Facility: LAB | Age: 64
End: 2024-03-15
Payer: MEDICARE

## 2024-03-15 DIAGNOSIS — C50.919 MALIGNANT NEOPLASM OF BREAST IN FEMALE, ESTROGEN RECEPTOR POSITIVE, UNSPECIFIED LATERALITY, UNSPECIFIED SITE OF BREAST (MULTI): ICD-10-CM

## 2024-03-15 DIAGNOSIS — Z17.0 MALIGNANT NEOPLASM OF RIGHT BREAST IN FEMALE, ESTROGEN RECEPTOR POSITIVE, UNSPECIFIED SITE OF BREAST (MULTI): ICD-10-CM

## 2024-03-15 DIAGNOSIS — C50.911 MALIGNANT NEOPLASM OF RIGHT BREAST IN FEMALE, ESTROGEN RECEPTOR POSITIVE, UNSPECIFIED SITE OF BREAST (MULTI): ICD-10-CM

## 2024-03-15 DIAGNOSIS — Z17.0 MALIGNANT NEOPLASM OF BREAST IN FEMALE, ESTROGEN RECEPTOR POSITIVE, UNSPECIFIED LATERALITY, UNSPECIFIED SITE OF BREAST (MULTI): ICD-10-CM

## 2024-03-15 PROBLEM — M65.332 ACQUIRED TRIGGER FINGER OF LEFT MIDDLE FINGER: Status: ACTIVE | Noted: 2024-02-22

## 2024-03-15 PROBLEM — Z90.79 ACQUIRED ABSENCE OF OTHER GENITAL ORGAN(S): Status: ACTIVE | Noted: 2023-01-10

## 2024-03-15 PROBLEM — M65.312 TRIGGER FINGER OF LEFT THUMB: Status: ACTIVE | Noted: 2024-02-22

## 2024-03-15 PROBLEM — E16.2 LOW BLOOD GLUCOSE MEASUREMENT: Status: ACTIVE | Noted: 2023-12-27

## 2024-03-15 LAB
ALBUMIN SERPL BCP-MCNC: 4.6 G/DL (ref 3.4–5)
ALP SERPL-CCNC: 68 U/L (ref 33–136)
ALT SERPL W P-5'-P-CCNC: 17 U/L (ref 7–45)
ANION GAP SERPL CALC-SCNC: 11 MMOL/L (ref 10–20)
APPEARANCE UR: CLEAR
AST SERPL W P-5'-P-CCNC: 17 U/L (ref 9–39)
BASOPHILS # BLD AUTO: 0.03 X10*3/UL (ref 0–0.1)
BASOPHILS NFR BLD AUTO: 0.7 %
BILIRUB SERPL-MCNC: 0.6 MG/DL (ref 0–1.2)
BILIRUB UR STRIP.AUTO-MCNC: NEGATIVE MG/DL
BUN SERPL-MCNC: 13 MG/DL (ref 6–23)
CALCIUM SERPL-MCNC: 9.3 MG/DL (ref 8.6–10.3)
CHLORIDE SERPL-SCNC: 102 MMOL/L (ref 98–107)
CO2 SERPL-SCNC: 28 MMOL/L (ref 21–32)
COLOR UR: YELLOW
CREAT SERPL-MCNC: 0.61 MG/DL (ref 0.5–1.05)
EGFRCR SERPLBLD CKD-EPI 2021: >90 ML/MIN/1.73M*2
EOSINOPHIL # BLD AUTO: 0.07 X10*3/UL (ref 0–0.7)
EOSINOPHIL NFR BLD AUTO: 1.6 %
ERYTHROCYTE [DISTWIDTH] IN BLOOD BY AUTOMATED COUNT: 13.2 % (ref 11.5–14.5)
ESTRADIOL SERPL-MCNC: 29 PG/ML
FSH SERPL-ACNC: 36.2 IU/L
GLUCOSE SERPL-MCNC: 86 MG/DL (ref 74–99)
GLUCOSE UR STRIP.AUTO-MCNC: NEGATIVE MG/DL
HCT VFR BLD AUTO: 43.1 % (ref 36–46)
HGB BLD-MCNC: 14.8 G/DL (ref 12–16)
IMM GRANULOCYTES # BLD AUTO: 0.01 X10*3/UL (ref 0–0.7)
IMM GRANULOCYTES NFR BLD AUTO: 0.2 % (ref 0–0.9)
KETONES UR STRIP.AUTO-MCNC: NEGATIVE MG/DL
LEUKOCYTE ESTERASE UR QL STRIP.AUTO: NEGATIVE
LH SERPL-ACNC: 19.7 IU/L
LYMPHOCYTES # BLD AUTO: 1.16 X10*3/UL (ref 1.2–4.8)
LYMPHOCYTES NFR BLD AUTO: 26.5 %
MAGNESIUM SERPL-MCNC: 1.98 MG/DL (ref 1.6–2.4)
MCH RBC QN AUTO: 33.8 PG (ref 26–34)
MCHC RBC AUTO-ENTMCNC: 34.3 G/DL (ref 32–36)
MCV RBC AUTO: 98 FL (ref 80–100)
MONOCYTES # BLD AUTO: 0.24 X10*3/UL (ref 0.1–1)
MONOCYTES NFR BLD AUTO: 5.5 %
NEUTROPHILS # BLD AUTO: 2.86 X10*3/UL (ref 1.2–7.7)
NEUTROPHILS NFR BLD AUTO: 65.5 %
NITRITE UR QL STRIP.AUTO: NEGATIVE
NRBC BLD-RTO: 0 /100 WBCS (ref 0–0)
PH UR STRIP.AUTO: 7 [PH]
PHOSPHATE SERPL-MCNC: 3.4 MG/DL (ref 2.5–4.9)
PLATELET # BLD AUTO: 204 X10*3/UL (ref 150–450)
POTASSIUM SERPL-SCNC: 4.2 MMOL/L (ref 3.5–5.3)
PROT SERPL-MCNC: 7.2 G/DL (ref 6.4–8.2)
PROT UR STRIP.AUTO-MCNC: NEGATIVE MG/DL
RBC # BLD AUTO: 4.38 X10*6/UL (ref 4–5.2)
RBC # UR STRIP.AUTO: NEGATIVE /UL
SODIUM SERPL-SCNC: 137 MMOL/L (ref 136–145)
SP GR UR STRIP.AUTO: 1.01
UROBILINOGEN UR STRIP.AUTO-MCNC: <2 MG/DL
WBC # BLD AUTO: 4.4 X10*3/UL (ref 4.4–11.3)

## 2024-03-15 PROCEDURE — 83735 ASSAY OF MAGNESIUM: CPT

## 2024-03-15 PROCEDURE — 82670 ASSAY OF TOTAL ESTRADIOL: CPT

## 2024-03-15 PROCEDURE — 81003 URINALYSIS AUTO W/O SCOPE: CPT

## 2024-03-15 PROCEDURE — 84100 ASSAY OF PHOSPHORUS: CPT

## 2024-03-15 PROCEDURE — 85025 COMPLETE CBC W/AUTO DIFF WBC: CPT

## 2024-03-15 PROCEDURE — 83002 ASSAY OF GONADOTROPIN (LH): CPT

## 2024-03-15 PROCEDURE — 36415 COLL VENOUS BLD VENIPUNCTURE: CPT

## 2024-03-15 PROCEDURE — 80053 COMPREHEN METABOLIC PANEL: CPT

## 2024-03-15 PROCEDURE — 83001 ASSAY OF GONADOTROPIN (FSH): CPT

## 2024-03-18 ENCOUNTER — OFFICE VISIT (OUTPATIENT)
Dept: HEMATOLOGY/ONCOLOGY | Facility: CLINIC | Age: 64
End: 2024-03-18
Payer: MEDICARE

## 2024-03-18 VITALS
WEIGHT: 135.58 LBS | TEMPERATURE: 96.3 F | RESPIRATION RATE: 16 BRPM | DIASTOLIC BLOOD PRESSURE: 78 MMHG | SYSTOLIC BLOOD PRESSURE: 123 MMHG | OXYGEN SATURATION: 99 % | BODY MASS INDEX: 21.88 KG/M2 | HEART RATE: 63 BPM

## 2024-03-18 DIAGNOSIS — Z17.0 MALIGNANT NEOPLASM OF BREAST IN FEMALE, ESTROGEN RECEPTOR POSITIVE, UNSPECIFIED LATERALITY, UNSPECIFIED SITE OF BREAST (MULTI): Primary | ICD-10-CM

## 2024-03-18 DIAGNOSIS — C50.919 MALIGNANT NEOPLASM OF BREAST IN FEMALE, ESTROGEN RECEPTOR POSITIVE, UNSPECIFIED LATERALITY, UNSPECIFIED SITE OF BREAST (MULTI): Primary | ICD-10-CM

## 2024-03-18 PROCEDURE — 1126F AMNT PAIN NOTED NONE PRSNT: CPT | Performed by: STUDENT IN AN ORGANIZED HEALTH CARE EDUCATION/TRAINING PROGRAM

## 2024-03-18 PROCEDURE — 99214 OFFICE O/P EST MOD 30 MIN: CPT | Performed by: STUDENT IN AN ORGANIZED HEALTH CARE EDUCATION/TRAINING PROGRAM

## 2024-03-18 PROCEDURE — 1036F TOBACCO NON-USER: CPT | Performed by: STUDENT IN AN ORGANIZED HEALTH CARE EDUCATION/TRAINING PROGRAM

## 2024-03-18 PROCEDURE — 1160F RVW MEDS BY RX/DR IN RCRD: CPT | Performed by: STUDENT IN AN ORGANIZED HEALTH CARE EDUCATION/TRAINING PROGRAM

## 2024-03-18 PROCEDURE — 1159F MED LIST DOCD IN RCRD: CPT | Performed by: STUDENT IN AN ORGANIZED HEALTH CARE EDUCATION/TRAINING PROGRAM

## 2024-03-18 PROCEDURE — 93005 ELECTROCARDIOGRAM TRACING: CPT | Performed by: STUDENT IN AN ORGANIZED HEALTH CARE EDUCATION/TRAINING PROGRAM

## 2024-03-18 ASSESSMENT — PAIN SCALES - GENERAL: PAINLEVEL: 0-NO PAIN

## 2024-03-18 NOTE — PROGRESS NOTES
[facility] Clinic  Location: [place]    Visit Type: Follow-up Visit    Oncologic History:    3/4/21: Screening Mammo: extremely dense tissue. Right breast calcifications.    3/30/21: Diagnostic B breast Continue yearly mammogram.: large marina like calcifications in lower inner quadrant of right breast.    21: R breast diagnostic Continue yearly mammogram.: Stable R breast calcifications   22: Bilateral diagnostic mammogram with targeted right breast US: R breast at 6:00 position 2 CFN, there is a hypoechoic mass, measuring 1.5 X 0.7 X2.5 cm corresponding to mammogram findings. Scanning of right axilla demonstrates no adenopathy. No  evidence of malignancy in the left breast.    22: R breast US: guided core needle biopsy:    .22:  review of outside images: biopsy proven malignancy at the 6:00 position of the R breast spanning approximately 2.5 cm.    22: R breast upper inner quadrant MRI guided biopsy    22: R breast total mastectomy with axillary sentinel LN biopsy. IDC with DCIS. 1.4 cm. 4 negative LN. G3. LVI present. negative margins. pT1cN0. ER positive % PA positive 81-90% HER2 negative. MammaPrint High risk, luminal B type.  -0.686.    22- 10/4/22: Adjuvant TC X4   2022: Enrolled on NSABP B61 trial- randomized to giradestrant arm    Subjective: Interval History    Presents today for routine follow-up visit.  Was seen by endocrinology for asymptomatic hypoglycemia and this was likely related to delay in sampling process seen.  Episodes of dizziness have not recurred since starting study drug.  Stable arthralgias.  Rare hot flashes.  No new concerns.  She is exercising regularly and eating healthy.  Very involved in the [place].    Gynecologic History:     Menarche 11  Menopause 52  , 25 years old first delivery  Did not BF  No OCP or HRT  Used Clomid     Pertinent Family history:    No family history of breast or ovarian cancer  Sister  had uterine and cervical cancer  Mother with lung cancer    Social History  Eloina Mejia  reports that she has never smoked. She has never used smokeless tobacco.  She  reports current alcohol use.  She  reports no history of drug use.    ROS:     Review of Systems   All other systems reviewed and are negative.       Physical Examination:    /78   Pulse 63   Temp 35.7 °C (96.3 °F)   Resp 16   Wt 61.5 kg (135 lb 9.3 oz)   SpO2 99%   BMI 21.88 kg/m²     Physical Exam  Vitals and nursing note reviewed.   Constitutional:       General: She is not in acute distress.     Appearance: Normal appearance. She is not toxic-appearing.   HENT:      Head: Normocephalic and atraumatic.      Mouth/Throat:      Pharynx: Oropharynx is clear.   Eyes:      Extraocular Movements: Extraocular movements intact.      Conjunctiva/sclera: Conjunctivae normal.   Cardiovascular:      Rate and Rhythm: Normal rate and regular rhythm.   Pulmonary:      Effort: Pulmonary effort is normal. No respiratory distress.   Abdominal:      General: Abdomen is flat. Bowel sounds are normal.      Palpations: Abdomen is soft.   Musculoskeletal:         General: No swelling. Normal range of motion.      Cervical back: Normal range of motion.   Skin:     General: Skin is warm and dry.   Neurological:      General: No focal deficit present.      Mental Status: She is alert.   Psychiatric:         Mood and Affect: Mood normal.         Breast Examination:    Deferred this visit    R chest wall without masses/lesions.   L breast is unremarkable.     ECOG Performance Status:     [x] 0 Fully active, able to carry on all pre-disease performance without restriction  [] 1 Restricted in physically strenuous activity but ambulatory and able to carry out work of a light or sedentary nature, e.g., light house work, office work  [] 2 Ambulatory and capable of all selfcare but unable to carry out any work activities; up and about more than 50% of waking hours  []  3 Capable of only limited selfcare; confined to bed or chair more than 50% of waking hours  [] 4 Completely disabled; cannot carry on any selfcare; totally confined to bed or chair  [] 5 Dead     Results:    Labs:  Reviewed    Imaging:  Reviewed    Pathology:  Reviewed    Assessment:     Pathologic prognostic stage IA (pT1c pN0 cM0) invasive ductal carcinoma of the R breast; Dx in April 2022; Grade 3; ER positive (%), OR positive (81-90%),  HER2 negative; High risk MammaPrint; S/p R mastectomy and SLNbx. S/p adjuvant TCX4. On giredestrant on NSABP B61 trial.     Eloina is a very pleasant post-menopausal woman with other history of HTN and IBS. She has resumed Giredestrant and is tolerating  very well.     Plan:    Surgical Plan: S/p R mastectomy and SLNbx  Additional biopsy: Not indicated  Radiation Plan: Not indicated  Additional staging scans/DEXA/echo: Staging scans not indicated based on current stage, patient history and physical examination. Dexa from 8/2021 with evidence of osteoporosis  Additional Path info (i.e Ki67, PDL1): Not indicated  Gene assays: MammaPrint high risk     Systemic treatment plan: Giredestrant                Intent: Curative             Clinical trial: Enrolled on [id number]: “A Phase III, Randomized, Open-Label, Multicenter Study Evaluating The Efficacy And Safety  Of Adjuvant Giredestrant Compared With Physician's Choice Of Adjuvant Endocrine Monotherapy In Patients With Estrogen Receptor-Positive, Her2- Negative Early Breast Cancer”                 Endocrine therapy: Trial with Giredestrant                HER2 treatment: Not indicated                Targeted agents: Not indicated                Chemotherapy: TC x4 completed 10/4/22                BMA: Receiving zometa every 6 months for 3 years. C2 7/10/23.     Access: Not indicated  Supportive meds: not indicated  Genetic testing: Not indicated  Fertility preservation: Not indicated  Other active problems/orders:      Dizziness:  Resolved Experiencing 6 weeks of intermittent, very transient, dizziness. There is a possibility these may be related to Giredestrant. Will do a 3 week trial off drug to assess for resolution of symptoms. Holter monitor planned. If work-up and trial off is unrevealing, will look into other etiologies.    Osteoporosis: On zometa - general recommendations for bone loss prevention which include 4938-7204 mg of calcium intake per day for adults  >50yrs, and no history of renal calculi; 800-1000 IU of vitamin D3 per day for adults >50yrs, and no history of renal calculi. Weight bearing exercise. Discontinue smoking. Avoid excessive use of caffeine, soft drinks, and alcoholic beverages. In addition,  balance training and fall prevention programs can help reduce the risk of fractures. Next DEXA due in 8/2023, monitored by PCP.     Surveillance plan: Yearly mammogram of L breast    Follow-up: Per trial protocol. Zometa due 8/2024.    Patient expressed understanding of the plan outlined above. She had ample time to ask questions. She understands she can contact us should she have additional questions or issues arise in the interim.

## 2024-03-18 NOTE — RESEARCH NOTES
Education Documentation  Returning to Work/School/Activities, taught by Katie Velez RN at 3/18/2024  4:46 PM.  Learner: Patient  Readiness: Eager  Method: Explanation, Teach-back  Response: Verbalizes Understanding    Stress Management, taught by Katie Velez RN at 3/18/2024  4:46 PM.  Learner: Patient  Readiness: Eager  Method: Explanation, Teach-back  Response: Verbalizes Understanding    Changing Role with Self and Family, taught by Katie Velez RN at 3/18/2024  4:46 PM.  Learner: Patient  Readiness: Eager  Method: Explanation, Teach-back  Response: Verbalizes Understanding    Leisure Education, taught by Katie Velez RN at 3/18/2024  4:46 PM.  Learner: Patient  Readiness: Eager  Method: Explanation, Teach-back  Response: Verbalizes Understanding    Healthy Lifestyle, taught by Katie Velez RN at 3/18/2024  4:46 PM.  Learner: Patient  Readiness: Eager  Method: Explanation, Teach-back  Response: Verbalizes Understanding    Monitoring Weight, taught by Katie Velez RN at 3/18/2024  4:46 PM.  Learner: Patient  Readiness: Eager  Method: Explanation, Teach-back  Response: Verbalizes Understanding    Tips for Daily Living, taught by Katie Velez RN at 3/18/2024  4:46 PM.  Learner: Patient  Readiness: Eager  Method: Explanation, Teach-back  Response: Verbalizes Understanding    Nutrition/Diet, taught by Katie Velez RN at 3/18/2024  4:46 PM.  Learner: Patient  Readiness: Eager  Method: Explanation, Teach-back  Response: Verbalizes Understanding    Food-Drug Interactions, taught by Katie Velez RN at 3/18/2024  4:46 PM.  Learner: Patient  Readiness: Eager  Method: Explanation, Teach-back  Response: Verbalizes Understanding    Nutrition, taught by Katie Velez RN at 3/18/2024  4:46 PM.  Learner: Patient  Readiness: Eager  Method: Explanation, Teach-back  Response: Verbalizes Understanding    Shortness of Breath, taught by Katie Velez RN at 3/18/2024  4:46 PM.  Learner: Patient  Readiness:  Eager  Method: Explanation, Teach-back  Response: Verbalizes Understanding    Changes in Appetite, taught by Katie Velez RN at 3/18/2024  4:46 PM.  Learner: Patient  Readiness: Eager  Method: Explanation, Teach-back  Response: Verbalizes Understanding    Fertility Issues, taught by Katie Velez RN at 3/18/2024  4:46 PM.  Learner: Patient  Readiness: Eager  Method: Explanation, Teach-back  Response: Verbalizes Understanding    Memory Problems, taught by Katie Velez RN at 3/18/2024  4:46 PM.  Learner: Patient  Readiness: Eager  Method: Explanation, Teach-back  Response: Verbalizes Understanding    Skin and Nail Changes, taught by Katie Velez RN at 3/18/2024  4:46 PM.  Learner: Patient  Readiness: Eager  Method: Explanation, Teach-back  Response: Verbalizes Understanding    Changes in Urination, taught by Katie Velez RN at 3/18/2024  4:46 PM.  Learner: Patient  Readiness: Eager  Method: Explanation, Teach-back  Response: Verbalizes Understanding    Bleeding Precautions, taught by Katie Velez RN at 3/18/2024  4:46 PM.  Learner: Patient  Readiness: Eager  Method: Explanation, Teach-back  Response: Verbalizes Understanding    Edema Management, taught by Katie Velez RN at 3/18/2024  4:46 PM.  Learner: Patient  Readiness: Eager  Method: Explanation, Teach-back  Response: Verbalizes Understanding    Care of Neuropathy, taught by Katie Velez RN at 3/18/2024  4:46 PM.  Learner: Patient  Readiness: Eager  Method: Explanation, Teach-back  Response: Verbalizes Understanding    Infection Control, taught by Katie Velez RN at 3/18/2024  4:46 PM.  Learner: Patient  Readiness: Eager  Method: Explanation, Teach-back  Response: Verbalizes Understanding    Alopecia, taught by Katie Velez RN at 3/18/2024  4:46 PM.  Learner: Patient  Readiness: Eager  Method: Explanation, Teach-back  Response: Verbalizes Understanding    Diarrhea, taught by Katie Velez RN at 3/18/2024  4:46 PM.  Learner: Patient  Readiness:  Eager  Method: Explanation, Teach-back  Response: Verbalizes Understanding    Constipation, taught by Katie Velez RN at 3/18/2024  4:46 PM.  Learner: Patient  Readiness: Eager  Method: Explanation, Teach-back  Response: Verbalizes Understanding    Mouth Sores, taught by Katie Velez RN at 3/18/2024  4:46 PM.  Learner: Patient  Readiness: Eager  Method: Explanation, Teach-back  Response: Verbalizes Understanding    Nausea Management, taught by Katie Velez RN at 3/18/2024  4:46 PM.  Learner: Patient  Readiness: Eager  Method: Explanation, Teach-back  Response: Verbalizes Understanding    Fatigue, taught by Katie Velez RN at 3/18/2024  4:46 PM.  Learner: Patient  Readiness: Eager  Method: Explanation, Teach-back  Response: Verbalizes Understanding    Radiation Therapy, taught by Katie Velez RN at 3/18/2024  4:46 PM.  Learner: Patient  Readiness: Eager  Method: Explanation, Teach-back  Response: Verbalizes Understanding    Post-Chemotherapy Education, taught by Katie Velez RN at 3/18/2024  4:46 PM.  Learner: Patient  Readiness: Eager  Method: Explanation, Teach-back  Response: Verbalizes Understanding    Pregnancy Prevention, taught by Katie Velez RN at 3/18/2024  4:46 PM.  Learner: Patient  Readiness: Eager  Method: Explanation, Teach-back  Response: Verbalizes Understanding    Chemotherapy Safety at Home, taught by Katie Velez RN at 3/18/2024  4:46 PM.  Learner: Patient  Readiness: Eager  Method: Explanation, Teach-back  Response: Verbalizes Understanding    Treatment Plan and Schedule, taught by Katie Velez RN at 3/18/2024  4:46 PM.  Learner: Patient  Readiness: Eager  Method: Explanation, Teach-back  Response: Verbalizes Understanding    General Medication Information, taught by Katie Velez RN at 3/18/2024  4:46 PM.  Learner: Patient  Readiness: Eager  Method: Explanation, Teach-back  Response: Verbalizes Understanding    Supportive Medications, taught by Katie Velez RN at 3/18/2024   4:46 PM.  Learner: Patient  Readiness: Eager  Method: Explanation, Teach-back  Response: Verbalizes Understanding    Diagnostic Studies, taught by Katie Velez RN at 3/18/2024  4:46 PM.  Learner: Patient  Readiness: Eager  Method: Explanation, Teach-back  Response: Verbalizes Understanding    Tumor Markers, taught by Katie Velez RN at 3/18/2024  4:46 PM.  Learner: Patient  Readiness: Eager  Method: Explanation, Teach-back  Response: Verbalizes Understanding    Comprehensive Metabolic Panel (CMP), taught by Katie Velez RN at 3/18/2024  4:46 PM.  Learner: Patient  Readiness: Eager  Method: Explanation, Teach-back  Response: Verbalizes Understanding    Complete Blood Count with Differential (CBC w/ Diff), taught by Katie Velez RN at 3/18/2024  4:46 PM.  Learner: Patient  Readiness: Eager  Method: Explanation, Teach-back  Response: Verbalizes Understanding    When and How to Contact Clinic, taught by Katie Velez RN at 3/18/2024  4:46 PM.  Learner: Patient  Readiness: Eager  Method: Explanation, Teach-back  Response: Verbalizes Understanding    Education Comments  No comments found.    Research RN with MD and Pt in clinic for C18D1, with spouse.  Pt reconsented to consent version 8-25-23, AE/ConMeds assessed, provided business card with Research RN's contact information, collected pill diaries and leftover study drug capsules from previous 3 cycles and provided new pill diaries and new study drug capsules for cycles 18-20.  Remaining study drug was counted (13 capsules) and returned to Pharmacy.  Biomarker samples drawn but were ultimately discarded due to inability of  to  after 4:30pm (deviation recorded).  Pt does not remember login information for QoL tablet; Research RN will troubleshoot with sponsor and attempt to obtain QoLs over the phone.  ECG was not performed today, as pt refused-- pt had one done last week for pre-admission testing.  Pt will have trigger finger release surgery on  Monday, 3/25/24, so she will stop taking drug for 7 days, starting on 3/19/24.  Coags and lipid panel were not drawn, as pt refused; [facility] informed pt that her insurance would not cover those tests.  Research RN will confirm that study will pay for coags; coverage analysis notes that lipid panel is to be drawn yearly.  Research RN to confirm what labwork is truly required for next cycle.

## 2024-03-20 RX ORDER — CEFAZOLIN SODIUM 2 G/100ML
2 INJECTION, SOLUTION INTRAVENOUS ONCE
Status: CANCELLED | OUTPATIENT
Start: 2024-03-21 | End: 2024-03-21

## 2024-04-06 LAB
ATRIAL RATE: 67 BPM
P AXIS: 70 DEGREES
P OFFSET: 210 MS
P ONSET: 153 MS
PR INTERVAL: 144 MS
Q ONSET: 225 MS
QRS COUNT: 11 BEATS
QRS DURATION: 90 MS
QT INTERVAL: 410 MS
QTC CALCULATION(BAZETT): 433 MS
QTC FREDERICIA: 425 MS
R AXIS: 29 DEGREES
T AXIS: 27 DEGREES
T OFFSET: 430 MS
VENTRICULAR RATE: 67 BPM

## 2024-05-02 ENCOUNTER — EDUCATION (OUTPATIENT)
Dept: HEMATOLOGY/ONCOLOGY | Facility: CLINIC | Age: 64
End: 2024-05-02
Payer: MEDICARE

## 2024-05-02 NOTE — RESEARCH NOTES
Spoke with patient to inform her of date/time for next visit for [id number] (C21D1), which will occur on 6/3/24 at 0830.      Education Documentation  Treatment Plan and Schedule, taught by Katie Velez RN at 5/2/2024  4:40 PM.  Learner: Patient  Readiness: Eager  Method: Teach-back  Response: Verbalizes Understanding    Diagnostic Studies, taught by Katie Velez RN at 5/2/2024  4:40 PM.  Learner: Patient  Readiness: Eager  Method: Teach-back  Response: Verbalizes Understanding    Comprehensive Metabolic Panel (CMP), taught by Katie Velez RN at 5/2/2024  4:40 PM.  Learner: Patient  Readiness: Eager  Method: Teach-back  Response: Verbalizes Understanding    Complete Blood Count with Differential (CBC w/ Diff), taught by Katie Velez RN at 5/2/2024  4:40 PM.  Learner: Patient  Readiness: Eager  Method: Teach-back  Response: Verbalizes Understanding    Education Comments  No comments found.

## 2024-05-31 DIAGNOSIS — C50.919 MALIGNANT NEOPLASM OF BREAST IN FEMALE, ESTROGEN RECEPTOR POSITIVE, UNSPECIFIED LATERALITY, UNSPECIFIED SITE OF BREAST (MULTI): Primary | ICD-10-CM

## 2024-05-31 DIAGNOSIS — Z17.0 MALIGNANT NEOPLASM OF BREAST IN FEMALE, ESTROGEN RECEPTOR POSITIVE, UNSPECIFIED LATERALITY, UNSPECIFIED SITE OF BREAST (MULTI): Primary | ICD-10-CM

## 2024-06-03 ENCOUNTER — OFFICE VISIT (OUTPATIENT)
Dept: HEMATOLOGY/ONCOLOGY | Facility: CLINIC | Age: 64
End: 2024-06-03
Payer: MEDICARE

## 2024-06-03 ENCOUNTER — LAB (OUTPATIENT)
Dept: LAB | Facility: CLINIC | Age: 64
End: 2024-06-03
Payer: MEDICARE

## 2024-06-03 VITALS
RESPIRATION RATE: 16 BRPM | BODY MASS INDEX: 21.63 KG/M2 | DIASTOLIC BLOOD PRESSURE: 74 MMHG | TEMPERATURE: 96.6 F | OXYGEN SATURATION: 98 % | HEART RATE: 68 BPM | SYSTOLIC BLOOD PRESSURE: 124 MMHG | WEIGHT: 134.04 LBS

## 2024-06-03 DIAGNOSIS — C50.919 MALIGNANT NEOPLASM OF BREAST IN FEMALE, ESTROGEN RECEPTOR POSITIVE, UNSPECIFIED LATERALITY, UNSPECIFIED SITE OF BREAST (MULTI): ICD-10-CM

## 2024-06-03 DIAGNOSIS — Z17.0 MALIGNANT NEOPLASM OF BREAST IN FEMALE, ESTROGEN RECEPTOR POSITIVE, UNSPECIFIED LATERALITY, UNSPECIFIED SITE OF BREAST (MULTI): ICD-10-CM

## 2024-06-03 DIAGNOSIS — C50.919 MALIGNANT NEOPLASM OF FEMALE BREAST, UNSPECIFIED ESTROGEN RECEPTOR STATUS, UNSPECIFIED LATERALITY, UNSPECIFIED SITE OF BREAST (MULTI): ICD-10-CM

## 2024-06-03 LAB
BASOPHILS # BLD AUTO: 0.03 X10*3/UL (ref 0–0.1)
BASOPHILS NFR BLD AUTO: 0.6 %
EOSINOPHIL # BLD AUTO: 0.06 X10*3/UL (ref 0–0.7)
EOSINOPHIL NFR BLD AUTO: 1.2 %
ERYTHROCYTE [DISTWIDTH] IN BLOOD BY AUTOMATED COUNT: 13 % (ref 11.5–14.5)
HCT VFR BLD AUTO: 42.1 % (ref 36–46)
HGB BLD-MCNC: 14.2 G/DL (ref 12–16)
IMM GRANULOCYTES # BLD AUTO: 0.01 X10*3/UL (ref 0–0.7)
IMM GRANULOCYTES NFR BLD AUTO: 0.2 % (ref 0–0.9)
LYMPHOCYTES # BLD AUTO: 1.13 X10*3/UL (ref 1.2–4.8)
LYMPHOCYTES NFR BLD AUTO: 22 %
MCH RBC QN AUTO: 33.9 PG (ref 26–34)
MCHC RBC AUTO-ENTMCNC: 33.7 G/DL (ref 32–36)
MCV RBC AUTO: 101 FL (ref 80–100)
MONOCYTES # BLD AUTO: 0.31 X10*3/UL (ref 0.1–1)
MONOCYTES NFR BLD AUTO: 6 %
NEUTROPHILS # BLD AUTO: 3.59 X10*3/UL (ref 1.2–7.7)
NEUTROPHILS NFR BLD AUTO: 70 %
NRBC BLD-RTO: ABNORMAL /100{WBCS}
PLATELET # BLD AUTO: 196 X10*3/UL (ref 150–450)
RBC # BLD AUTO: 4.19 X10*6/UL (ref 4–5.2)
WBC # BLD AUTO: 5.1 X10*3/UL (ref 4.4–11.3)

## 2024-06-03 PROCEDURE — 99214 OFFICE O/P EST MOD 30 MIN: CPT | Performed by: STUDENT IN AN ORGANIZED HEALTH CARE EDUCATION/TRAINING PROGRAM

## 2024-06-03 PROCEDURE — 85025 COMPLETE CBC W/AUTO DIFF WBC: CPT

## 2024-06-03 PROCEDURE — 80061 LIPID PANEL: CPT | Performed by: STUDENT IN AN ORGANIZED HEALTH CARE EDUCATION/TRAINING PROGRAM

## 2024-06-03 PROCEDURE — 83001 ASSAY OF GONADOTROPIN (FSH): CPT | Performed by: STUDENT IN AN ORGANIZED HEALTH CARE EDUCATION/TRAINING PROGRAM

## 2024-06-03 PROCEDURE — 36415 COLL VENOUS BLD VENIPUNCTURE: CPT

## 2024-06-03 PROCEDURE — 85610 PROTHROMBIN TIME: CPT | Performed by: STUDENT IN AN ORGANIZED HEALTH CARE EDUCATION/TRAINING PROGRAM

## 2024-06-03 PROCEDURE — 80053 COMPREHEN METABOLIC PANEL: CPT | Performed by: STUDENT IN AN ORGANIZED HEALTH CARE EDUCATION/TRAINING PROGRAM

## 2024-06-03 PROCEDURE — 1159F MED LIST DOCD IN RCRD: CPT | Performed by: STUDENT IN AN ORGANIZED HEALTH CARE EDUCATION/TRAINING PROGRAM

## 2024-06-03 PROCEDURE — 85730 THROMBOPLASTIN TIME PARTIAL: CPT | Performed by: STUDENT IN AN ORGANIZED HEALTH CARE EDUCATION/TRAINING PROGRAM

## 2024-06-03 PROCEDURE — 81003 URINALYSIS AUTO W/O SCOPE: CPT

## 2024-06-03 PROCEDURE — 1126F AMNT PAIN NOTED NONE PRSNT: CPT | Performed by: STUDENT IN AN ORGANIZED HEALTH CARE EDUCATION/TRAINING PROGRAM

## 2024-06-03 PROCEDURE — 82670 ASSAY OF TOTAL ESTRADIOL: CPT | Performed by: STUDENT IN AN ORGANIZED HEALTH CARE EDUCATION/TRAINING PROGRAM

## 2024-06-03 ASSESSMENT — PAIN SCALES - GENERAL: PAINLEVEL: 0-NO PAIN

## 2024-06-03 NOTE — PROGRESS NOTES
Breast Medical Oncology Clinic  Location: [place]    Visit Type: Follow-up Visit    Oncologic History:    3/4/21: Screening Mammo: extremely dense tissue. Right breast calcifications.    3/30/21: Diagnostic B breast Continue yearly mammogram.: large marina like calcifications in lower inner quadrant of right breast.    21: R breast diagnostic Continue yearly mammogram.: Stable R breast calcifications   22: Bilateral diagnostic mammogram with targeted right breast US: R breast at 6:00 position 2 CFN, there is a hypoechoic mass, measuring 1.5 X 0.7 X2.5 cm corresponding to mammogram findings. Scanning of right axilla demonstrates no adenopathy. No  evidence of malignancy in the left breast.    22: R breast US: guided core needle biopsy:    .22:  review of outside images: biopsy proven malignancy at the 6:00 position of the R breast spanning approximately 2.5 cm.    22: R breast upper inner quadrant MRI guided biopsy    22: R breast total mastectomy with axillary sentinel LN biopsy. IDC with DCIS. 1.4 cm. 4 negative LN. G3. LVI present. negative margins. pT1cN0. ER positive % CA positive 81-90% HER2 negative. MammaPrint High risk, luminal B type.  -0.686.    22- 10/4/22: Adjuvant TC X4   2022: Enrolled on NSABP B61 trial- randomized to giradestrant arm    Subjective: Interval History    Presents today for routine follow-up visit.      Remains on treatment. Reports stiffness is better.     Continues to have some difficulty with sleep- occasionally wakes up in the middle of the night. Takes 5-10 mg of melatonin as needed.     Hot flashes are better.     Denies weight loss, changes in the breast and/or chest wall, new aches or pains, changes in appetite or energy level. No current concerns at this time.     Gynecologic History:     Menarche 11  Menopause 52  , 25 years old first delivery  Did not BF  No OCP or HRT  Used Clomid     Pertinent Family history:    No family  history of breast or ovarian cancer  Sister had uterine and cervical cancer  Mother with lung cancer    Social History  Eloina Mejia  reports that she has never smoked. She has never used smokeless tobacco.  She  reports current alcohol use.  She  reports no history of drug use.    ROS:     Review of Systems   All other systems reviewed and are negative.       Physical Examination:    /74   Pulse 68   Temp 35.9 °C (96.6 °F)   Resp 16   Wt 60.8 kg (134 lb 0.6 oz)   SpO2 98%   BMI 21.63 kg/m²     Physical Exam  Vitals and nursing note reviewed.   Constitutional:       General: She is not in acute distress.     Appearance: Normal appearance. She is not toxic-appearing.   HENT:      Head: Normocephalic and atraumatic.      Mouth/Throat:      Pharynx: Oropharynx is clear.   Eyes:      Extraocular Movements: Extraocular movements intact.      Conjunctiva/sclera: Conjunctivae normal.   Cardiovascular:      Rate and Rhythm: Normal rate and regular rhythm.   Pulmonary:      Effort: Pulmonary effort is normal. No respiratory distress.   Abdominal:      General: Abdomen is flat. Bowel sounds are normal.      Palpations: Abdomen is soft.   Musculoskeletal:         General: No swelling. Normal range of motion.      Cervical back: Normal range of motion.   Skin:     General: Skin is warm and dry.   Neurological:      General: No focal deficit present.      Mental Status: She is alert.   Psychiatric:         Mood and Affect: Mood normal.       Breast Examination:    Deferred this visit    R chest wall without masses/lesions.   L breast is unremarkable.     ECOG Performance Status:     [x] 0 Fully active, able to carry on all pre-disease performance without restriction  [] 1 Restricted in physically strenuous activity but ambulatory and able to carry out work of a light or sedentary nature, e.g., light house work, office work  [] 2 Ambulatory and capable of all selfcare but unable to carry out any work activities; up  and about more than 50% of waking hours  [] 3 Capable of only limited selfcare; confined to bed or chair more than 50% of waking hours  [] 4 Completely disabled; cannot carry on any selfcare; totally confined to bed or chair  [] 5 Dead     Results:    Labs:  Reviewed    Imaging:  Reviewed    Pathology:  Reviewed    Assessment:     Pathologic prognostic stage IA (pT1c pN0 cM0) invasive ductal carcinoma of the R breast; Dx in April 2022; Grade 3; ER positive (%), ND positive (81-90%),  HER2 negative; High risk MammaPrint; S/p R mastectomy and SLNbx. S/p adjuvant TCX4. On giredestrant on NSABP B61 trial.     Eloina is a very pleasant post-menopausal woman with other history of HTN and IBS. Tolerating treatment well. No evidence of breast cancer recurrence per patient history, physical examination and imaging findings.     Plan:    Surgical Plan: S/p R mastectomy and SLNbx  Additional biopsy: Not indicated  Radiation Plan: Not indicated  Additional staging scans/DEXA/echo: Staging scans not indicated based on current stage, patient history and physical examination. Dexa from 8/2021 with evidence of osteoporosis  Additional Path info (i.e Ki67, PDL1): Not indicated  Gene assays: MammaPrint high risk     Systemic treatment plan: Giredestrant                Intent: Curative             Clinical trial: Enrolled on [id number]: “A Phase III, Randomized, Open-Label, Multicenter Study Evaluating The Efficacy And Safety  Of Adjuvant Giredestrant Compared With Physician's Choice Of Adjuvant Endocrine Monotherapy In Patients With Estrogen Receptor-Positive, Her2- Negative Early Breast Cancer”                 Endocrine therapy: Trial with Giredestrant                HER2 treatment: Not indicated                Targeted agents: Not indicated                Chemotherapy: TC x4 completed 10/4/22                BMA: Receiving zometa every 6 months for 3 years. C2 7/10/23.     Access: Not indicated  Supportive meds: not  indicated  Genetic testing: Not indicated  Fertility preservation: Not indicated  Other active problems/orders:       Osteoporosis: On zometa - general recommendations for bone loss prevention which include 9088-9516 mg of calcium intake per day for adults  >50yrs, and no history of renal calculi; 800-1000 IU of vitamin D3 per day for adults >50yrs, and no history of renal calculi. Weight bearing exercise. Discontinue smoking. Avoid excessive use of caffeine, soft drinks, and alcoholic beverages. In addition,  balance training and fall prevention programs can help reduce the risk of fractures. Next DEXA due in 8/2023, monitored by PCP.     Surveillance plan: Yearly mammogram of L breast    Follow-up: Per trial protocol. C4 of 6 Zometa due 8/2024.    Patient expressed understanding of the plan outlined above. She had ample time to ask questions. She understands she can contact us should she have additional questions or issues arise in the interim.

## 2024-06-03 NOTE — RESEARCH NOTES
Pt seen in clinic for C21-23.  This visit occurred approximately 1.5 weeks early due to physician availability (physician is on vacation next week).  For next visit due in 90 days, physician is also unavailable to see the patient at [place] the week of 9/1 due to Labor Day holiday, so pt will be seen next for C24-26 on 9/9/24.  As this is the case, Research RN instructed pt to retain 7 capsules of giredestrant from the batch she was given for C21-23.  New 90 day supply given to patient for C24-26 today, old pill diary collected, new one given, and 8 leftover capsules returned to Pharmacy.  Safety labs collected today.  Tablet for QoLs not able to connect to Internet at [place]; Research RN will try connecting to her home wifi later today and collect responses by phone.  Pt will have next imaging in January 2024.    Education Documentation  Healthy Lifestyle, taught by Katie Velez RN at 6/3/2024 12:10 PM.  Learner: Patient  Readiness: Eager  Method: Explanation  Response: Verbalizes Understanding    Diarrhea, taught by Katie Velez RN at 6/3/2024 12:10 PM.  Learner: Patient  Readiness: Eager  Method: Explanation  Response: Verbalizes Understanding    Fatigue, taught by Katie Velez RN at 6/3/2024 12:10 PM.  Learner: Patient  Readiness: Eager  Method: Explanation  Response: Verbalizes Understanding    Post-Chemotherapy Education, taught by Katie Velez RN at 6/3/2024 12:10 PM.  Learner: Patient  Readiness: Eager  Method: Explanation  Response: Verbalizes Understanding    Chemotherapy Safety at Home, taught by Katie Velez RN at 6/3/2024 12:10 PM.  Learner: Patient  Readiness: Eager  Method: Explanation  Response: Verbalizes Understanding    General Medication Information, taught by Katie Velez RN at 6/3/2024 12:10 PM.  Learner: Patient  Readiness: Eager  Method: Explanation  Response: Verbalizes Understanding    Supportive Medications, taught by Katie Velez RN at 6/3/2024 12:10  PM.  Learner: Patient  Readiness: Eager  Method: Explanation  Response: Verbalizes Understanding    Tumor Markers, taught by Katie Velez RN at 6/3/2024 12:10 PM.  Learner: Patient  Readiness: Eager  Method: Explanation  Response: Verbalizes Understanding    Education Comments  No comments found.

## 2024-06-04 LAB
ALBUMIN SERPL BCP-MCNC: 4.7 G/DL (ref 3.4–5)
ALP SERPL-CCNC: 58 U/L (ref 33–136)
ALT SERPL W P-5'-P-CCNC: 19 U/L (ref 7–45)
ANION GAP SERPL CALC-SCNC: 13 MMOL/L (ref 10–20)
APPEARANCE UR: CLEAR
APTT PPP: 31 SECONDS (ref 27–38)
AST SERPL W P-5'-P-CCNC: 19 U/L (ref 9–39)
BILIRUB SERPL-MCNC: 0.6 MG/DL (ref 0–1.2)
BILIRUB UR STRIP.AUTO-MCNC: NEGATIVE MG/DL
BUN SERPL-MCNC: 10 MG/DL (ref 6–23)
CALCIUM SERPL-MCNC: 10.4 MG/DL (ref 8.6–10.6)
CHLORIDE SERPL-SCNC: 104 MMOL/L (ref 98–107)
CHOLEST SERPL-MCNC: 215 MG/DL (ref 0–199)
CHOLESTEROL/HDL RATIO: 2.9
CO2 SERPL-SCNC: 28 MMOL/L (ref 21–32)
COLOR UR: COLORLESS
CREAT SERPL-MCNC: 0.57 MG/DL (ref 0.5–1.05)
EGFRCR SERPLBLD CKD-EPI 2021: >90 ML/MIN/1.73M*2
ESTRADIOL SERPL-MCNC: 31 PG/ML
FSH SERPL-ACNC: 37.1 IU/L
GLUCOSE SERPL-MCNC: 89 MG/DL (ref 74–99)
GLUCOSE UR STRIP.AUTO-MCNC: NORMAL MG/DL
HDLC SERPL-MCNC: 74.1 MG/DL
INR PPP: 1 (ref 0.9–1.1)
KETONES UR STRIP.AUTO-MCNC: NEGATIVE MG/DL
LDLC SERPL CALC-MCNC: 121 MG/DL
LEUKOCYTE ESTERASE UR QL STRIP.AUTO: NEGATIVE
LH SERPL-ACNC: 18.9 IU/L
NITRITE UR QL STRIP.AUTO: NEGATIVE
NON HDL CHOLESTEROL: 141 MG/DL (ref 0–149)
PH UR STRIP.AUTO: 7 [PH]
POTASSIUM SERPL-SCNC: 3.9 MMOL/L (ref 3.5–5.3)
PROT SERPL-MCNC: 7.2 G/DL (ref 6.4–8.2)
PROT UR STRIP.AUTO-MCNC: NEGATIVE MG/DL
PROTHROMBIN TIME: 10.7 SECONDS (ref 9.8–12.8)
RBC # UR STRIP.AUTO: NEGATIVE /UL
SODIUM SERPL-SCNC: 141 MMOL/L (ref 136–145)
SP GR UR STRIP.AUTO: 1
TRIGL SERPL-MCNC: 98 MG/DL (ref 0–149)
UROBILINOGEN UR STRIP.AUTO-MCNC: NORMAL MG/DL
VLDL: 20 MG/DL (ref 0–40)

## 2024-06-11 ENCOUNTER — OFFICE VISIT (OUTPATIENT)
Dept: NEUROLOGY | Facility: CLINIC | Age: 64
End: 2024-06-11
Payer: MEDICARE

## 2024-06-11 VITALS
DIASTOLIC BLOOD PRESSURE: 70 MMHG | HEART RATE: 65 BPM | BODY MASS INDEX: 21.69 KG/M2 | WEIGHT: 135 LBS | TEMPERATURE: 96.9 F | HEIGHT: 66 IN | SYSTOLIC BLOOD PRESSURE: 112 MMHG

## 2024-06-11 DIAGNOSIS — R41.3 MEMORY LOSS: Primary | ICD-10-CM

## 2024-06-11 PROCEDURE — 1036F TOBACCO NON-USER: CPT | Performed by: STUDENT IN AN ORGANIZED HEALTH CARE EDUCATION/TRAINING PROGRAM

## 2024-06-11 PROCEDURE — 1159F MED LIST DOCD IN RCRD: CPT | Performed by: STUDENT IN AN ORGANIZED HEALTH CARE EDUCATION/TRAINING PROGRAM

## 2024-06-11 PROCEDURE — 99204 OFFICE O/P NEW MOD 45 MIN: CPT | Performed by: STUDENT IN AN ORGANIZED HEALTH CARE EDUCATION/TRAINING PROGRAM

## 2024-06-11 ASSESSMENT — PATIENT HEALTH QUESTIONNAIRE - PHQ9
SUM OF ALL RESPONSES TO PHQ9 QUESTIONS 1 & 2: 0
2. FEELING DOWN, DEPRESSED OR HOPELESS: NOT AT ALL
1. LITTLE INTEREST OR PLEASURE IN DOING THINGS: NOT AT ALL

## 2024-06-11 ASSESSMENT — LIFESTYLE VARIABLES
HOW MANY STANDARD DRINKS CONTAINING ALCOHOL DO YOU HAVE ON A TYPICAL DAY: 1 OR 2
HOW OFTEN DO YOU HAVE A DRINK CONTAINING ALCOHOL: MONTHLY OR LESS
SKIP TO QUESTIONS 9-10: 0
HOW OFTEN DO YOU HAVE SIX OR MORE DRINKS ON ONE OCCASION: MONTHLY
AUDIT-C TOTAL SCORE: 3

## 2024-06-11 ASSESSMENT — MINI MENTAL STATE EXAM: SUM ALL MMSE QUESTIONS FOR TOTAL SCORE [OUT OF 30].: 30

## 2024-06-11 NOTE — PROGRESS NOTES
Subjective     Eloina Mejia is a 63 y.o. year old female who presents for evaluation of memory loss. She is accompanied to clinic by her .     She has noticed difficulty with recalling names of people. She could not recall the name of the event that they were at. She has trouble paying attention at times, her  will have to restate what happened on a tv show they were watching. This has been more noticeable over the past 3-4 years.     She is independent with the finances. She has a system and it has worked well. She had one time where she could not balance the checkbook properly but otherwise has been able to do this without issue. She drives without issue, good with directions and google maps. She does well socially, sometimes cannot recall what she wants to talk about and will just skip around the conversation. Overall that happens on occasion.     She breaks up tasks at home to help her complete tasks. She has always had difficulty with concentration but recalls having to study more because of these difficulties.     She has no new headaches, vision changes, or balance issues.     She has a history of paroxysmal atrial fibrillation follows with electrophysiology. She also has had right adenocarcinoma of the breast 2 years ago, stage 1. She had a mastectomy and chemotherapy and is on a clinical trial medication called giredestrant which is similar to anastrozole.     She has a surgical history of atrial flutter ablation in 2008 and mastectomy 2022.     She has no family history of memory loss or dementia. Father passed away at 54 from heart disease and mother at 77 from lung cancer.     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 She has a remote history of rare tobacco use in college. She drinks 2 drinks every other week, is on the [employer] diet. She does not use illicit drugs.     Patient Active  Problem List   Diagnosis    Breast cancer (Multi)    Physical therapy evaluation, initial    Atrial flutter (Multi)    Basal cell carcinoma (BCC) of skin of left upper extremity including shoulder    Diffuse cystic mastopathy    History of HPV infection    Lumbago    Osteoporosis    Palpitation    Post-menopause bleeding    S/P ablation of atrial flutter    Malignant neoplasm of unspecified site of right female breast (Multi)    Adenocarcinoma of breast, right (Multi)    Acquired absence of genital organs    Personal history of malignant neoplasm of breast    History of malignant neoplasm of breast    Healthcare maintenance    Palpitations    Low back pain    History of infection due to human papilloma virus (HPV)    Fibrocystic breast changes    Basal cell carcinoma (BCC) of upper extremity    Postmenopausal bleeding    History of atrial flutter    Encounter for follow-up surveillance of breast cancer    Trigger thumb of left hand    Trigger finger, left middle finger    Acquired absence of other genital organ(s)    Trigger finger of left thumb    Low blood glucose measurement    Acquired trigger finger of left middle finger     Past Medical History:   Diagnosis Date    Atrial flutter (Multi)     Basal cell carcinoma     Breast cancer (Multi)     right    Low back pain     Palpitations     PONV (postoperative nausea and vomiting)      Past Surgical History:   Procedure Laterality Date    BI US GUIDED BREAST LOCALIZATION AND BIOPSY RIGHT Right 04/21/2022    BI US GUIDED BREAST LOCALIZATION AND BIOPSY RIGHT 4/21/2022    CARDIAC ELECTROPHYSIOLOGY STUDY AND ABLATION      COLONOSCOPY      MASTECTOMY Right     with chemo    OTHER SURGICAL HISTORY  05/04/2022    Breast biopsy core needle    OTHER SURGICAL HISTORY  06/20/2022    Right mastectomy     Social History     Tobacco Use    Smoking status: Never    Smokeless tobacco: Never   Substance Use Topics    Alcohol use: Yes     Comment: rarely     family history includes  Diabetes in her brother and sister; Heart attack in her father; Lung cancer in her mother; Uterine cancer in her sister.    Current Outpatient Medications:     acetaminophen (Tylenol 8 Hour) 650 mg ER tablet, Take 1 tablet (650 mg) by mouth every 8 hours if needed., Disp: , Rfl:     biotin 1 mg tablet, Take 1 tablet (1 mg) by mouth once daily., Disp: , Rfl:     calcium-minerals-D3-K2-silicon 200 mg calcium- 200 unit tablet, Vit Calcium Citrate + D Active, Disp: , Rfl:     cholecalciferol (Vitamin D-3) 50 mcg (2,000 unit) capsule, Take 1 capsule (50 mcg) by mouth., Disp: , Rfl:     melatonin 10 mg tablet, Take 5 mg by mouth if needed., Disp: , Rfl:     Study [id number] giredestrant 30 mg capsule, 30 mg, Disp: , Rfl:   No Known Allergies    Objective   Neurological Exam  Mental Status  Awake, alert and oriented to person, place and time. Speech is normal. MMSE score: 30.    Cranial Nerves  CN II: Visual fields full to confrontation.  CN III, IV, VI: Extraocular movements intact bilaterally.  CN V: Facial sensation is normal.  CN VII: Full and symmetric facial movement.  CN VIII: Hearing is normal.  CN IX, X: Palate elevates symmetrically  CN XI: Shoulder shrug strength is normal.  CN XII: Tongue midline without atrophy or fasciculations.    Motor   Strength is 5/5 throughout all four extremities.    Sensory  Light touch is normal in upper and lower extremities.     Coordination  Right: Finger-to-nose normal.Left: Finger-to-nose normal.    Gait  Casual gait is normal including stance, stride, and arm swing.    Physical Exam  Eyes:      Extraocular Movements: Extraocular movements intact.   Neurological:      Motor: Motor strength is normal.  Psychiatric:         Speech: Speech normal.         Assessment and Plan:   There are no diagnoses linked to this encounter.    Memory loss: occasional word finding difficulty. Remains functionally independent. Some history of inattention which may be contributing to age related  memory difficulties. MMSE 30/30. Presentation consistent with normal age related cognitive concerns. Consider checking annual B12 and TSH. Reviewed healthy lifestyle brain interventions    Follow-up in [facility]

## 2024-07-18 ENCOUNTER — HOSPITAL ENCOUNTER (OUTPATIENT)
Dept: RADIOLOGY | Facility: CLINIC | Age: 64
Discharge: HOME | End: 2024-07-18

## 2024-07-18 DIAGNOSIS — Z85.3 ENCOUNTER FOR FOLLOW-UP SURVEILLANCE OF BREAST CANCER: ICD-10-CM

## 2024-07-18 DIAGNOSIS — Z08 ENCOUNTER FOR FOLLOW-UP SURVEILLANCE OF BREAST CANCER: ICD-10-CM

## 2024-07-18 PROCEDURE — 2550000001 HC RX 255 CONTRASTS: Performed by: NURSE PRACTITIONER

## 2024-07-18 PROCEDURE — A9575 INJ GADOTERATE MEGLUMI 0.1ML: HCPCS | Performed by: NURSE PRACTITIONER

## 2024-07-18 PROCEDURE — 6100000003 BI MR BREAST BILATERAL WITH CONTRAST FAST SCREENING SELF PAY

## 2024-07-18 RX ORDER — GADOTERATE MEGLUMINE 376.9 MG/ML
12 INJECTION INTRAVENOUS
Status: COMPLETED | OUTPATIENT
Start: 2024-07-18 | End: 2024-07-18

## 2024-07-19 ENCOUNTER — APPOINTMENT (OUTPATIENT)
Dept: RADIOLOGY | Facility: HOSPITAL | Age: 64
End: 2024-07-19
Payer: MEDICARE

## 2024-08-06 ENCOUNTER — APPOINTMENT (OUTPATIENT)
Dept: NEUROLOGY | Facility: CLINIC | Age: 64
End: 2024-08-06
Payer: MEDICARE

## 2024-08-07 ENCOUNTER — INFUSION (OUTPATIENT)
Dept: HEMATOLOGY/ONCOLOGY | Facility: CLINIC | Age: 64
End: 2024-08-07
Payer: MEDICARE

## 2024-08-07 ENCOUNTER — PATIENT MESSAGE (OUTPATIENT)
Dept: HEMATOLOGY/ONCOLOGY | Facility: CLINIC | Age: 64
End: 2024-08-07

## 2024-08-07 VITALS
RESPIRATION RATE: 14 BRPM | OXYGEN SATURATION: 98 % | SYSTOLIC BLOOD PRESSURE: 116 MMHG | HEART RATE: 71 BPM | WEIGHT: 134.26 LBS | HEIGHT: 66 IN | BODY MASS INDEX: 21.58 KG/M2 | DIASTOLIC BLOOD PRESSURE: 67 MMHG | TEMPERATURE: 97.5 F

## 2024-08-07 DIAGNOSIS — C50.919 MALIGNANT NEOPLASM OF FEMALE BREAST, UNSPECIFIED ESTROGEN RECEPTOR STATUS, UNSPECIFIED LATERALITY, UNSPECIFIED SITE OF BREAST (MULTI): ICD-10-CM

## 2024-08-07 DIAGNOSIS — Z17.0 MALIGNANT NEOPLASM OF BREAST IN FEMALE, ESTROGEN RECEPTOR POSITIVE, UNSPECIFIED LATERALITY, UNSPECIFIED SITE OF BREAST (MULTI): Primary | ICD-10-CM

## 2024-08-07 DIAGNOSIS — C50.919 MALIGNANT NEOPLASM OF BREAST IN FEMALE, ESTROGEN RECEPTOR POSITIVE, UNSPECIFIED LATERALITY, UNSPECIFIED SITE OF BREAST (MULTI): Primary | ICD-10-CM

## 2024-08-07 DIAGNOSIS — Z17.0 MALIGNANT NEOPLASM OF RIGHT BREAST IN FEMALE, ESTROGEN RECEPTOR POSITIVE, UNSPECIFIED SITE OF BREAST (MULTI): ICD-10-CM

## 2024-08-07 DIAGNOSIS — C50.911 MALIGNANT NEOPLASM OF RIGHT BREAST IN FEMALE, ESTROGEN RECEPTOR POSITIVE, UNSPECIFIED SITE OF BREAST (MULTI): ICD-10-CM

## 2024-08-07 PROCEDURE — 2500000004 HC RX 250 GENERAL PHARMACY W/ HCPCS (ALT 636 FOR OP/ED): Performed by: STUDENT IN AN ORGANIZED HEALTH CARE EDUCATION/TRAINING PROGRAM

## 2024-08-07 PROCEDURE — 96365 THER/PROPH/DIAG IV INF INIT: CPT | Mod: INF

## 2024-08-07 RX ORDER — ALBUTEROL SULFATE 0.83 MG/ML
3 SOLUTION RESPIRATORY (INHALATION) AS NEEDED
OUTPATIENT
Start: 2024-08-28

## 2024-08-07 RX ORDER — FAMOTIDINE 10 MG/ML
20 INJECTION INTRAVENOUS ONCE AS NEEDED
OUTPATIENT
Start: 2024-08-28

## 2024-08-07 RX ORDER — EPINEPHRINE 0.3 MG/.3ML
0.3 INJECTION SUBCUTANEOUS EVERY 5 MIN PRN
OUTPATIENT
Start: 2024-08-28

## 2024-08-07 RX ORDER — HEPARIN 100 UNIT/ML
500 SYRINGE INTRAVENOUS AS NEEDED
OUTPATIENT
Start: 2024-08-07

## 2024-08-07 RX ORDER — HEPARIN SODIUM,PORCINE/PF 10 UNIT/ML
50 SYRINGE (ML) INTRAVENOUS AS NEEDED
OUTPATIENT
Start: 2024-08-07

## 2024-08-07 RX ORDER — DIPHENHYDRAMINE HYDROCHLORIDE 50 MG/ML
50 INJECTION INTRAMUSCULAR; INTRAVENOUS AS NEEDED
OUTPATIENT
Start: 2024-08-28

## 2024-08-07 ASSESSMENT — PAIN SCALES - GENERAL: PAINLEVEL: 2

## 2024-08-07 NOTE — PROGRESS NOTES
Called patient to inform of dr. Kamara recommendation to d/w PCP.  Spoke with patient via phone and advised on recommendation.  Patient states she already reached out to dermatologist and waiting for return call.  Suggested PCP- patient will wait to hear from derm.  No further needs.  Instructed to call if any other issues and for update if needed.  Call back instructions reviewed.

## 2024-08-07 NOTE — PROGRESS NOTES
[facility] Infusion Note     Eloina Mejia is a 63 y.o. year old female here today,08/07/24,  in the [facility] infusion center for Zoledronic Acid infusion.       Medications given:  Administrations This Visit       sodium chloride 0.9 % bolus 500 mL       Admin Date  08/07/2024 Action  New Bag Dose  500 mL Rate  999 mL/hr Route  intravenous Documented By  Jenny Carrion RN              zoledronic acid (Zometa) 4 mg in dextrose 5% 100 mL IV       Admin Date  08/07/2024 Action  New Bag Dose  4 mg Route  intravenous Documented By  Jenny Carrion, MARTA                    Pt tolerated infusion well and was discharged to home in stable condition. Pt ambulated to exit independently with a slow and steady gait. Pt had no further questions or concerns at this time.

## 2024-09-06 DIAGNOSIS — C50.919 MALIGNANT NEOPLASM OF BREAST IN FEMALE, ESTROGEN RECEPTOR POSITIVE, UNSPECIFIED LATERALITY, UNSPECIFIED SITE OF BREAST (MULTI): Primary | ICD-10-CM

## 2024-09-06 DIAGNOSIS — Z17.0 MALIGNANT NEOPLASM OF BREAST IN FEMALE, ESTROGEN RECEPTOR POSITIVE, UNSPECIFIED LATERALITY, UNSPECIFIED SITE OF BREAST (MULTI): Primary | ICD-10-CM

## 2024-09-09 ENCOUNTER — OFFICE VISIT (OUTPATIENT)
Dept: HEMATOLOGY/ONCOLOGY | Facility: CLINIC | Age: 64
End: 2024-09-09
Payer: MEDICARE

## 2024-09-09 ENCOUNTER — INFUSION (OUTPATIENT)
Dept: HEMATOLOGY/ONCOLOGY | Facility: CLINIC | Age: 64
End: 2024-09-09
Payer: MEDICARE

## 2024-09-09 ENCOUNTER — LAB (OUTPATIENT)
Dept: LAB | Facility: CLINIC | Age: 64
End: 2024-09-09
Payer: MEDICARE

## 2024-09-09 VITALS
SYSTOLIC BLOOD PRESSURE: 125 MMHG | BODY MASS INDEX: 21.53 KG/M2 | WEIGHT: 134.26 LBS | DIASTOLIC BLOOD PRESSURE: 72 MMHG | OXYGEN SATURATION: 98 % | RESPIRATION RATE: 12 BRPM | HEART RATE: 60 BPM | TEMPERATURE: 97.3 F

## 2024-09-09 DIAGNOSIS — C50.911 ADENOCARCINOMA OF BREAST, RIGHT (MULTI): ICD-10-CM

## 2024-09-09 DIAGNOSIS — Z17.0 MALIGNANT NEOPLASM OF BREAST IN FEMALE, ESTROGEN RECEPTOR POSITIVE, UNSPECIFIED LATERALITY, UNSPECIFIED SITE OF BREAST (MULTI): ICD-10-CM

## 2024-09-09 DIAGNOSIS — Z09 ENCOUNTER FOR FOLLOW-UP EXAMINATION AFTER COMPLETED TREATMENT FOR CONDITIONS OTHER THAN MALIGNANT NEOPLASM: ICD-10-CM

## 2024-09-09 DIAGNOSIS — C50.919 MALIGNANT NEOPLASM OF BREAST IN FEMALE, ESTROGEN RECEPTOR POSITIVE, UNSPECIFIED LATERALITY, UNSPECIFIED SITE OF BREAST (MULTI): ICD-10-CM

## 2024-09-09 LAB
BASOPHILS # BLD AUTO: 0.03 X10*3/UL (ref 0–0.1)
BASOPHILS NFR BLD AUTO: 0.5 %
EOSINOPHIL # BLD AUTO: 0.07 X10*3/UL (ref 0–0.7)
EOSINOPHIL NFR BLD AUTO: 1.2 %
ERYTHROCYTE [DISTWIDTH] IN BLOOD BY AUTOMATED COUNT: 12.4 % (ref 11.5–14.5)
HCT VFR BLD AUTO: 41.1 % (ref 36–46)
HGB BLD-MCNC: 14.1 G/DL (ref 12–16)
HOLD SPECIMEN: NORMAL
HOLD SPECIMEN: NORMAL
IMM GRANULOCYTES # BLD AUTO: 0.01 X10*3/UL (ref 0–0.7)
IMM GRANULOCYTES NFR BLD AUTO: 0.2 % (ref 0–0.9)
LYMPHOCYTES # BLD AUTO: 1.54 X10*3/UL (ref 1.2–4.8)
LYMPHOCYTES NFR BLD AUTO: 25.4 %
MCH RBC QN AUTO: 33.8 PG (ref 26–34)
MCHC RBC AUTO-ENTMCNC: 34.3 G/DL (ref 32–36)
MCV RBC AUTO: 99 FL (ref 80–100)
MONOCYTES # BLD AUTO: 0.4 X10*3/UL (ref 0.1–1)
MONOCYTES NFR BLD AUTO: 6.6 %
NEUTROPHILS # BLD AUTO: 4.02 X10*3/UL (ref 1.2–7.7)
NEUTROPHILS NFR BLD AUTO: 66.1 %
NRBC BLD-RTO: NORMAL /100{WBCS}
PLATELET # BLD AUTO: 191 X10*3/UL (ref 150–450)
RBC # BLD AUTO: 4.17 X10*6/UL (ref 4–5.2)
WBC # BLD AUTO: 6.1 X10*3/UL (ref 4.4–11.3)

## 2024-09-09 PROCEDURE — 83001 ASSAY OF GONADOTROPIN (FSH): CPT | Performed by: STUDENT IN AN ORGANIZED HEALTH CARE EDUCATION/TRAINING PROGRAM

## 2024-09-09 PROCEDURE — 1159F MED LIST DOCD IN RCRD: CPT | Performed by: STUDENT IN AN ORGANIZED HEALTH CARE EDUCATION/TRAINING PROGRAM

## 2024-09-09 PROCEDURE — 99214 OFFICE O/P EST MOD 30 MIN: CPT | Performed by: STUDENT IN AN ORGANIZED HEALTH CARE EDUCATION/TRAINING PROGRAM

## 2024-09-09 PROCEDURE — 85610 PROTHROMBIN TIME: CPT | Performed by: STUDENT IN AN ORGANIZED HEALTH CARE EDUCATION/TRAINING PROGRAM

## 2024-09-09 PROCEDURE — 1126F AMNT PAIN NOTED NONE PRSNT: CPT | Performed by: STUDENT IN AN ORGANIZED HEALTH CARE EDUCATION/TRAINING PROGRAM

## 2024-09-09 PROCEDURE — 80053 COMPREHEN METABOLIC PANEL: CPT | Performed by: STUDENT IN AN ORGANIZED HEALTH CARE EDUCATION/TRAINING PROGRAM

## 2024-09-09 PROCEDURE — 80061 LIPID PANEL: CPT | Performed by: STUDENT IN AN ORGANIZED HEALTH CARE EDUCATION/TRAINING PROGRAM

## 2024-09-09 PROCEDURE — 82670 ASSAY OF TOTAL ESTRADIOL: CPT | Performed by: STUDENT IN AN ORGANIZED HEALTH CARE EDUCATION/TRAINING PROGRAM

## 2024-09-09 PROCEDURE — 85730 THROMBOPLASTIN TIME PARTIAL: CPT | Performed by: STUDENT IN AN ORGANIZED HEALTH CARE EDUCATION/TRAINING PROGRAM

## 2024-09-09 PROCEDURE — 81003 URINALYSIS AUTO W/O SCOPE: CPT

## 2024-09-09 PROCEDURE — 85025 COMPLETE CBC W/AUTO DIFF WBC: CPT

## 2024-09-09 PROCEDURE — 36415 COLL VENOUS BLD VENIPUNCTURE: CPT

## 2024-09-09 ASSESSMENT — PAIN SCALES - GENERAL: PAINLEVEL: 0-NO PAIN

## 2024-09-09 NOTE — RESEARCH NOTES
Research Note Treatment Day    Eloian Mejia is here today for exam/labs/ECG on [facility]. Today is C23D25; this visit for C24-C26. Procedures completed per protocol. Old pill diary collected except for last two pages of diary for C23 and new diaries given for C24-C26.  New study drug supply given and 4 empty bottles returned to Pharmacy.  12-lead ECG performed prior to study activities and after resting state.  Correlative samples sent by Julieth Soares to [facility].  Tablet not able to connect to Internet for QoL administration, but this writer will call [employer] desk and attempt to remedy this problem ASAP.  AE's and con-meds reviewed with patient. Patient is aware of treatment plan.    [x]   Received treatment as planned   OR  []    Treatment delayed; patient calendar updated as required   Treatment delayed because:    []   AE    []   Physician Discretion    []   Clinical Deterioration or Progression     []   Other    Education Documentation  Pain Medication Actions & Side Effects, taught by Katie Velez RN at 9/9/2024  6:05 PM.  Learner: Patient  Readiness: Eager  Method: Explanation, Demonstration  Response: Verbalizes Understanding    Patient Controlled Analgesia, taught by Katie Velez RN at 9/9/2024  6:05 PM.  Learner: Patient  Readiness: Eager  Method: Explanation, Demonstration  Response: Verbalizes Understanding    Discuss the Use of Pain Control Measures Before Pain Becomes Severe, taught by Katie Velez RN at 9/9/2024  6:05 PM.  Learner: Patient  Readiness: Eager  Method: Explanation, Demonstration  Response: Verbalizes Understanding    Pain Management, taught by Katie Velez RN at 9/9/2024  6:05 PM.  Learner: Patient  Readiness: Eager  Method: Explanation, Demonstration  Response: Verbalizes Understanding    Pain Rating Scale, taught by Katie Velez RN at 9/9/2024  6:05 PM.  Learner: Patient  Readiness: Eager  Method: Explanation, Demonstration  Response: Verbalizes  Understanding    Oriented to Facility, taught by Katie Velez RN at 9/9/2024  6:05 PM.  Learner: Patient  Readiness: Eager  Method: Explanation, Demonstration  Response: Verbalizes Understanding    Returning to Work/School/Activities, taught by Katie Velez RN at 9/9/2024  6:05 PM.  Learner: Patient  Readiness: Eager  Method: Explanation, Demonstration  Response: Verbalizes Understanding    Stress Management, taught by Katie Velez RN at 9/9/2024  6:05 PM.  Learner: Patient  Readiness: Eager  Method: Explanation, Demonstration  Response: Verbalizes Understanding    Changing Role with Self and Family, taught by Katie Velez RN at 9/9/2024  6:05 PM.  Learner: Patient  Readiness: Eager  Method: Explanation, Demonstration  Response: Verbalizes Understanding    Leisure Education, taught by Katie Velez RN at 9/9/2024  6:05 PM.  Learner: Patient  Readiness: Eager  Method: Explanation, Demonstration  Response: Verbalizes Understanding    Shortness of Breath, taught by Katie Velez RN at 9/9/2024  6:05 PM.  Learner: Patient  Readiness: Eager  Method: Explanation, Demonstration  Response: Verbalizes Understanding    Changes in Appetite, taught by Katie Velez RN at 9/9/2024  6:05 PM.  Learner: Patient  Readiness: Eager  Method: Explanation, Demonstration  Response: Verbalizes Understanding    Fertility Issues, taught by Katie Velez RN at 9/9/2024  6:05 PM.  Learner: Patient  Readiness: Eager  Method: Explanation, Demonstration  Response: Verbalizes Understanding    Memory Problems, taught by Katie Velez RN at 9/9/2024  6:05 PM.  Learner: Patient  Readiness: Eager  Method: Explanation, Demonstration  Response: Verbalizes Understanding    Skin and Nail Changes, taught by Katie Velez RN at 9/9/2024  6:05 PM.  Learner: Patient  Readiness: Eager  Method: Explanation, Demonstration  Response: Verbalizes Understanding    Changes in Urination, taught by Katie Velez RN at 9/9/2024  6:05 PM.  Learner:  Patient  Readiness: Eager  Method: Explanation, Demonstration  Response: Verbalizes Understanding    Bleeding Precautions, taught by Katie Velez RN at 9/9/2024  6:05 PM.  Learner: Patient  Readiness: Eager  Method: Explanation, Demonstration  Response: Verbalizes Understanding    Edema Management, taught by Katie Velez RN at 9/9/2024  6:05 PM.  Learner: Patient  Readiness: Eager  Method: Explanation, Demonstration  Response: Verbalizes Understanding    Care of Neuropathy, taught by Katie Velez RN at 9/9/2024  6:05 PM.  Learner: Patient  Readiness: Eager  Method: Explanation, Demonstration  Response: Verbalizes Understanding    Infection Control, taught by Katie Velez RN at 9/9/2024  6:05 PM.  Learner: Patient  Readiness: Eager  Method: Explanation, Demonstration  Response: Verbalizes Understanding    Alopecia, taught by Katie Velez RN at 9/9/2024  6:05 PM.  Learner: Patient  Readiness: Eager  Method: Explanation, Demonstration  Response: Verbalizes Understanding    Diarrhea, taught by Katie Velez RN at 9/9/2024  6:05 PM.  Learner: Patient  Readiness: Eager  Method: Explanation, Demonstration  Response: Verbalizes Understanding    Constipation, taught by Katie Velez RN at 9/9/2024  6:05 PM.  Learner: Patient  Readiness: Eager  Method: Explanation, Demonstration  Response: Verbalizes Understanding    Mouth Sores, taught by Katie Velez RN at 9/9/2024  6:05 PM.  Learner: Patient  Readiness: Eager  Method: Explanation, Demonstration  Response: Verbalizes Understanding    Nausea Management, taught by Katie Velez RN at 9/9/2024  6:05 PM.  Learner: Patient  Readiness: Eager  Method: Explanation, Demonstration  Response: Verbalizes Understanding    Fatigue, taught by Katie Velez RN at 9/9/2024  6:05 PM.  Learner: Patient  Readiness: Eager  Method: Explanation, Demonstration  Response: Verbalizes Understanding    Chemotherapy Safety at Home, taught by Katie Velez RN at 9/9/2024  6:05  PM.  Learner: Patient  Readiness: Eager  Method: Explanation, Demonstration  Response: Verbalizes Understanding    Treatment Plan and Schedule, taught by Katie Velez RN at 9/9/2024  6:05 PM.  Learner: Patient  Readiness: Eager  Method: Explanation, Demonstration  Response: Verbalizes Understanding    Diagnostic Studies, taught by Katie Velez RN at 9/9/2024  6:05 PM.  Learner: Patient  Readiness: Eager  Method: Explanation, Demonstration  Response: Verbalizes Understanding    Tumor Markers, taught by Katie Velez RN at 9/9/2024  6:05 PM.  Learner: Patient  Readiness: Eager  Method: Explanation, Demonstration  Response: Verbalizes Understanding    Comprehensive Metabolic Panel (CMP), taught by Katie Velez RN at 9/9/2024  6:05 PM.  Learner: Patient  Readiness: Eager  Method: Explanation, Demonstration  Response: Verbalizes Understanding    Complete Blood Count with Differential (CBC w/ Diff), taught by Katie Velez RN at 9/9/2024  6:05 PM.  Learner: Patient  Readiness: Eager  Method: Explanation, Demonstration  Response: Verbalizes Understanding    General Medication Information, taught by Katie Velez RN at 9/9/2024 10:24 AM.  Learner: Patient  Readiness: Eager  Method: Explanation  Response: Verbalizes Understanding    Tumor Markers, taught by Katie Velez RN at 9/9/2024 10:24 AM.  Learner: Patient  Readiness: Eager  Method: Explanation  Response: Verbalizes Understanding    When and How to Contact Clinic, taught by Katie Velez RN at 9/9/2024 10:24 AM.  Learner: Patient  Readiness: Eager  Method: Explanation  Response: Verbalizes Understanding    Education Comments  No comments found.

## 2024-09-09 NOTE — PROGRESS NOTES
Spoke with patient this morning to let her know an ECG and research labs are due prior to her visit today for C24 on [id number].  Pt will arrive by 1430 for these activities prior to her MD visit today.    Education Documentation  General Medication Information, taught by Katie Velez RN at 9/9/2024 10:24 AM.  Learner: Patient  Readiness: Eager  Method: Explanation  Response: Verbalizes Understanding    Tumor Markers, taught by Katie Velez RN at 9/9/2024 10:24 AM.  Learner: Patient  Readiness: Eager  Method: Explanation  Response: Verbalizes Understanding    When and How to Contact Clinic, taught by Katie Velez RN at 9/9/2024 10:24 AM.  Learner: Patient  Readiness: Eager  Method: Explanation  Response: Verbalizes Understanding    Education Comments  No comments found.

## 2024-09-09 NOTE — PROGRESS NOTES
Breast Medical Oncology Clinic  Location: [facility]    Visit Type: Follow-up Visit    Oncologic History:    3/4/21: Screening Mammo: extremely dense tissue. Right breast calcifications.    3/30/21: Diagnostic B breast Continue yearly mammogram.: large marina like calcifications in lower inner quadrant of right breast.    21: R breast diagnostic Continue yearly mammogram.: Stable R breast calcifications   22: Bilateral diagnostic mammogram with targeted right breast US: R breast at 6:00 position 2 CFN, there is a hypoechoic mass, measuring 1.5 X 0.7 X2.5 cm corresponding to mammogram findings. Scanning of right axilla demonstrates no adenopathy. No  evidence of malignancy in the left breast.    22: R breast US: guided core needle biopsy:    .22:  review of outside images: biopsy proven malignancy at the 6:00 position of the R breast spanning approximately 2.5 cm.    22: R breast upper inner quadrant MRI guided biopsy    22: R breast total mastectomy with axillary sentinel LN biopsy. IDC with DCIS. 1.4 cm. 4 negative LN. G3. LVI present. negative margins. pT1cN0. ER positive % VT positive 81-90% HER2 negative. MammaPrint High risk, luminal B type.  -0.686.    22- 10/4/22: Adjuvant TC X4   2022: Enrolled on NSABP B61 trial- randomized to giradestrant arm    Subjective: Interval History    Patient presents today for follow-up visit.  She is accompanied by her .  No major interval events since she was last seen.  She did not undergo trigger finger surgery instead went for injection.  She continues to tolerate her medication well. Denies weight loss, changes in the breast and/or chest wall, new aches or pains, changes in appetite or energy level. No current concerns at this time.     Gynecologic History:     Menarche 11  Menopause 52  , 25 years old first delivery  Did not BF  No OCP or HRT  Used Clomid     Pertinent Family history:    No family history of breast or  ovarian cancer  Sister had uterine and cervical cancer  Mother with lung cancer    Social History  Eloina Roberto  reports that she has never smoked. She has never used smokeless tobacco.  She  reports current alcohol use.  She  reports no history of drug use.    ROS:     Review of Systems   All other systems reviewed and are negative.       Physical Examination:    /72   Pulse 60   Temp 36.3 °C (97.3 °F)   Resp 12   Wt 60.9 kg (134 lb 4.2 oz)   SpO2 98%   BMI 21.53 kg/m²     Physical Exam  Vitals and nursing note reviewed.   Constitutional:       General: She is not in acute distress.     Appearance: Normal appearance. She is not toxic-appearing.   HENT:      Head: Normocephalic and atraumatic.      Mouth/Throat:      Pharynx: Oropharynx is clear.   Eyes:      Extraocular Movements: Extraocular movements intact.      Conjunctiva/sclera: Conjunctivae normal.   Cardiovascular:      Rate and Rhythm: Normal rate and regular rhythm.   Pulmonary:      Effort: Pulmonary effort is normal. No respiratory distress.   Abdominal:      General: Abdomen is flat. Bowel sounds are normal.      Palpations: Abdomen is soft.   Musculoskeletal:         General: No swelling. Normal range of motion.      Cervical back: Normal range of motion.   Skin:     General: Skin is warm and dry.   Neurological:      General: No focal deficit present.      Mental Status: She is alert.   Psychiatric:         Mood and Affect: Mood normal.       Breast Examination:    R chest wall without masses/lesions.   L breast is unremarkable.  No skin masses or nipple changes  ECOG Performance Status:     [x] 0 Fully active, able to carry on all pre-disease performance without restriction  [] 1 Restricted in physically strenuous activity but ambulatory and able to carry out work of a light or sedentary nature, e.g., light house work, office work  [] 2 Ambulatory and capable of all selfcare but unable to carry out any work activities; up and about more  than 50% of waking hours  [] 3 Capable of only limited selfcare; confined to bed or chair more than 50% of waking hours  [] 4 Completely disabled; cannot carry on any selfcare; totally confined to bed or chair  [] 5 Dead     Results:    Labs:  Reviewed    Imaging:  Reviewed    Pathology:  Reviewed    Assessment:     Pathologic prognostic stage IA (pT1c pN0 cM0) invasive ductal carcinoma of the R breast; Dx in April 2022; Grade 3; ER positive (%), WV positive (81-90%),  HER2 negative; High risk MammaPrint; S/p R mastectomy and SLNbx. S/p adjuvant TCX4. On giredestrant on NSABP B61 trial.     Eloina is a very pleasant post-menopausal woman with other history of HTN and IBS. Tolerating treatment well. No evidence of breast cancer recurrence per patient history, physical examination and imaging findings.     Plan:    Surgical Plan: S/p R mastectomy and SLNbx  Additional biopsy: Not indicated  Radiation Plan: Not indicated  Additional staging scans/DEXA/echo: Staging scans not indicated based on current stage, patient history and physical examination. Dexa from 8/2021 with evidence of osteoporosis  Additional Path info (i.e Ki67, PDL1): Not indicated  Gene assays: MammaPrint high risk     Systemic treatment plan: Giredestrant                Intent: Curative             Clinical trial: Enrolled on [id number]: “A Phase III, Randomized, Open-Label, Multicenter Study Evaluating The Efficacy And Safety  Of Adjuvant Giredestrant Compared With Physician's Choice Of Adjuvant Endocrine Monotherapy In Patients With Estrogen Receptor-Positive, Her2- Negative Early Breast Cancer”                 Endocrine therapy: Trial with Giredestrant                HER2 treatment: Not indicated                Targeted agents: Not indicated                Chemotherapy: TC x4 completed 10/4/22                BMA: Receiving zometa every 6 months for 3 years. C2 7/10/23.     Access: Not indicated  Supportive meds: not indicated  Genetic testing:  Not indicated  Fertility preservation: Not indicated  Other active problems/orders:       Osteoporosis: On zometa - general recommendations for bone loss prevention which include 8357-0935 mg of calcium intake per day for adults  >50yrs, and no history of renal calculi; 800-1000 IU of vitamin D3 per day for adults >50yrs, and no history of renal calculi. Weight bearing exercise. Discontinue smoking. Avoid excessive use of caffeine, soft drinks, and alcoholic beverages. In addition,  balance training and fall prevention programs can help reduce the risk of fractures. Next DEXA due in 8/2023, monitored by PCP.     Surveillance plan: Yearly mammogram of L breast    Follow-up: Per trial protocol. C5 of 6 Zometa due 1/22/2025    Patient expressed understanding of the plan outlined above. She had ample time to ask questions. She understands she can contact us should she have additional questions or issues arise in the interim.

## 2024-09-10 LAB
ALBUMIN SERPL BCP-MCNC: 4.4 G/DL (ref 3.4–5)
ALP SERPL-CCNC: 66 U/L (ref 33–136)
ALT SERPL W P-5'-P-CCNC: 14 U/L (ref 7–45)
ANION GAP SERPL CALC-SCNC: 13 MMOL/L (ref 10–20)
APPEARANCE UR: CLEAR
APTT PPP: 30 SECONDS (ref 27–38)
AST SERPL W P-5'-P-CCNC: 14 U/L (ref 9–39)
BILIRUB SERPL-MCNC: 0.5 MG/DL (ref 0–1.2)
BILIRUB UR STRIP.AUTO-MCNC: NEGATIVE MG/DL
BUN SERPL-MCNC: 12 MG/DL (ref 6–23)
CALCIUM SERPL-MCNC: 9.9 MG/DL (ref 8.6–10.6)
CHLORIDE SERPL-SCNC: 100 MMOL/L (ref 98–107)
CHOLEST SERPL-MCNC: 205 MG/DL (ref 0–199)
CHOLESTEROL/HDL RATIO: 3.2
CO2 SERPL-SCNC: 28 MMOL/L (ref 21–32)
COLOR UR: COLORLESS
CREAT SERPL-MCNC: 0.63 MG/DL (ref 0.5–1.05)
EGFRCR SERPLBLD CKD-EPI 2021: >90 ML/MIN/1.73M*2
ESTRADIOL SERPL-MCNC: 31 PG/ML
FSH SERPL-ACNC: 34.5 IU/L
GLUCOSE SERPL-MCNC: 82 MG/DL (ref 74–99)
GLUCOSE UR STRIP.AUTO-MCNC: NORMAL MG/DL
HDLC SERPL-MCNC: 64.8 MG/DL
INR PPP: 0.9 (ref 0.9–1.1)
KETONES UR STRIP.AUTO-MCNC: NEGATIVE MG/DL
LDLC SERPL CALC-MCNC: 101 MG/DL
LEUKOCYTE ESTERASE UR QL STRIP.AUTO: NEGATIVE
LH SERPL-ACNC: 17 IU/L
NITRITE UR QL STRIP.AUTO: NEGATIVE
NON HDL CHOLESTEROL: 140 MG/DL (ref 0–149)
PH UR STRIP.AUTO: 7 [PH]
POTASSIUM SERPL-SCNC: 4.1 MMOL/L (ref 3.5–5.3)
PROT SERPL-MCNC: 6.7 G/DL (ref 6.4–8.2)
PROT UR STRIP.AUTO-MCNC: NEGATIVE MG/DL
PROTHROMBIN TIME: 10.4 SECONDS (ref 9.8–12.8)
RBC # UR STRIP.AUTO: NEGATIVE /UL
SODIUM SERPL-SCNC: 137 MMOL/L (ref 136–145)
SP GR UR STRIP.AUTO: 1
TRIGL SERPL-MCNC: 196 MG/DL (ref 0–149)
UROBILINOGEN UR STRIP.AUTO-MCNC: NORMAL MG/DL
VLDL: 39 MG/DL (ref 0–40)

## 2024-10-08 ENCOUNTER — TELEPHONE (OUTPATIENT)
Dept: ADMISSION | Facility: HOSPITAL | Age: 64
End: 2024-10-08
Payer: MEDICARE

## 2024-10-08 ENCOUNTER — HOSPITAL ENCOUNTER (OUTPATIENT)
Dept: RADIOLOGY | Facility: CLINIC | Age: 64
Discharge: HOME | End: 2024-10-08
Payer: MEDICARE

## 2024-10-08 DIAGNOSIS — C50.911 ADENOCARCINOMA OF BREAST, RIGHT (MULTI): ICD-10-CM

## 2024-10-08 DIAGNOSIS — Z09 ENCOUNTER FOR FOLLOW-UP EXAMINATION AFTER COMPLETED TREATMENT FOR CONDITIONS OTHER THAN MALIGNANT NEOPLASM: ICD-10-CM

## 2024-10-08 NOTE — TELEPHONE ENCOUNTER
Pt went to have Dexa scan today- was told she couldn't have it because she took her calcium supplement this morning.   She also was told that she should have testing at same facility that she had original testing at for continuity. 1st dexa scan was approx 2.5yrs ago at [facility].   Please advise.   Last FUV 9/9, next FUV 12/2 with GABRIELLE Moreno CNP

## 2024-10-08 NOTE — TELEPHONE ENCOUNTER
Pt updated per GABRIELLE Moreno CNP directive - she should establish dexa testing with  for ongoing comparisons. Pt should have received directions from dexa scan dept re restrictions prior to testing.   Pt updated and transferred to radiology scheduling line

## 2024-11-06 ENCOUNTER — APPOINTMENT (OUTPATIENT)
Dept: RADIOLOGY | Facility: CLINIC | Age: 64
End: 2024-11-06
Payer: MEDICARE

## 2024-11-12 ENCOUNTER — HOSPITAL ENCOUNTER (OUTPATIENT)
Dept: RADIOLOGY | Facility: CLINIC | Age: 64
Discharge: HOME | End: 2024-11-12
Payer: MEDICARE

## 2024-11-12 PROCEDURE — 77080 DXA BONE DENSITY AXIAL: CPT | Performed by: RADIOLOGY

## 2024-11-12 PROCEDURE — 77080 DXA BONE DENSITY AXIAL: CPT

## 2024-11-27 DIAGNOSIS — C50.911 ADENOCARCINOMA OF BREAST, RIGHT (MULTI): Primary | ICD-10-CM

## 2024-12-02 ENCOUNTER — OFFICE VISIT (OUTPATIENT)
Dept: HEMATOLOGY/ONCOLOGY | Facility: HOSPITAL | Age: 64
End: 2024-12-02
Payer: MEDICARE

## 2024-12-02 ENCOUNTER — LAB (OUTPATIENT)
Dept: LAB | Facility: HOSPITAL | Age: 64
End: 2024-12-02
Payer: MEDICARE

## 2024-12-02 VITALS
RESPIRATION RATE: 18 BRPM | DIASTOLIC BLOOD PRESSURE: 79 MMHG | BODY MASS INDEX: 22.53 KG/M2 | OXYGEN SATURATION: 98 % | SYSTOLIC BLOOD PRESSURE: 130 MMHG | HEIGHT: 66 IN | WEIGHT: 140.21 LBS | HEART RATE: 63 BPM | TEMPERATURE: 97.5 F

## 2024-12-02 DIAGNOSIS — Z00.6 EXAMINATION OF PARTICIPANT IN CLINICAL TRIAL: ICD-10-CM

## 2024-12-02 DIAGNOSIS — Z51.81 ENCOUNTER FOR MONITORING BISPHOSPHONATE THERAPY: ICD-10-CM

## 2024-12-02 DIAGNOSIS — C50.919 MALIGNANT NEOPLASM OF BREAST IN FEMALE, ESTROGEN RECEPTOR POSITIVE, UNSPECIFIED LATERALITY, UNSPECIFIED SITE OF BREAST: ICD-10-CM

## 2024-12-02 DIAGNOSIS — Z17.0 MALIGNANT NEOPLASM OF BREAST IN FEMALE, ESTROGEN RECEPTOR POSITIVE, UNSPECIFIED LATERALITY, UNSPECIFIED SITE OF BREAST: ICD-10-CM

## 2024-12-02 DIAGNOSIS — Z17.0 MALIGNANT NEOPLASM OF RIGHT BREAST IN FEMALE, ESTROGEN RECEPTOR POSITIVE, UNSPECIFIED SITE OF BREAST: ICD-10-CM

## 2024-12-02 DIAGNOSIS — C50.911 ADENOCARCINOMA OF BREAST, RIGHT (MULTI): ICD-10-CM

## 2024-12-02 DIAGNOSIS — C50.911 MALIGNANT NEOPLASM OF RIGHT BREAST IN FEMALE, ESTROGEN RECEPTOR POSITIVE, UNSPECIFIED SITE OF BREAST: ICD-10-CM

## 2024-12-02 DIAGNOSIS — Z85.3 ENCOUNTER FOR FOLLOW-UP SURVEILLANCE OF BREAST CANCER: ICD-10-CM

## 2024-12-02 DIAGNOSIS — Z08 ENCOUNTER FOR FOLLOW-UP SURVEILLANCE OF BREAST CANCER: ICD-10-CM

## 2024-12-02 DIAGNOSIS — M85.80 OSTEOPENIA, UNSPECIFIED LOCATION: ICD-10-CM

## 2024-12-02 DIAGNOSIS — Z79.83 ENCOUNTER FOR MONITORING BISPHOSPHONATE THERAPY: ICD-10-CM

## 2024-12-02 PROBLEM — M81.0 OSTEOPOROSIS: Status: RESOLVED | Noted: 2023-07-10 | Resolved: 2024-12-02

## 2024-12-02 LAB
ALBUMIN SERPL BCP-MCNC: 4.5 G/DL (ref 3.4–5)
ALP SERPL-CCNC: 51 U/L (ref 33–136)
ALT SERPL W P-5'-P-CCNC: 15 U/L (ref 7–45)
ANION GAP SERPL CALC-SCNC: 12 MMOL/L (ref 10–20)
AST SERPL W P-5'-P-CCNC: 17 U/L (ref 9–39)
BASOPHILS # BLD AUTO: 0.04 X10*3/UL (ref 0–0.1)
BASOPHILS NFR BLD AUTO: 0.7 %
BILIRUB SERPL-MCNC: 0.6 MG/DL (ref 0–1.2)
BUN SERPL-MCNC: 11 MG/DL (ref 6–23)
CALCIUM SERPL-MCNC: 9.8 MG/DL (ref 8.6–10.3)
CHLORIDE SERPL-SCNC: 102 MMOL/L (ref 98–107)
CO2 SERPL-SCNC: 30 MMOL/L (ref 21–32)
CREAT SERPL-MCNC: 0.67 MG/DL (ref 0.5–1.05)
EGFRCR SERPLBLD CKD-EPI 2021: >90 ML/MIN/1.73M*2
EOSINOPHIL # BLD AUTO: 0.07 X10*3/UL (ref 0–0.7)
EOSINOPHIL NFR BLD AUTO: 1.2 %
ERYTHROCYTE [DISTWIDTH] IN BLOOD BY AUTOMATED COUNT: 12.8 % (ref 11.5–14.5)
ESTRADIOL SERPL-MCNC: <19 PG/ML
FSH SERPL-ACNC: 42.4 IU/L
GLUCOSE SERPL-MCNC: 82 MG/DL (ref 74–99)
HCT VFR BLD AUTO: 40.5 % (ref 36–46)
HGB BLD-MCNC: 13.9 G/DL (ref 12–16)
IMM GRANULOCYTES # BLD AUTO: 0.02 X10*3/UL (ref 0–0.7)
IMM GRANULOCYTES NFR BLD AUTO: 0.3 % (ref 0–0.9)
LYMPHOCYTES # BLD AUTO: 1.57 X10*3/UL (ref 1.2–4.8)
LYMPHOCYTES NFR BLD AUTO: 27.1 %
MAGNESIUM SERPL-MCNC: 2.06 MG/DL (ref 1.6–2.4)
MCH RBC QN AUTO: 33.5 PG (ref 26–34)
MCHC RBC AUTO-ENTMCNC: 34.3 G/DL (ref 32–36)
MCV RBC AUTO: 98 FL (ref 80–100)
MONOCYTES # BLD AUTO: 0.32 X10*3/UL (ref 0.1–1)
MONOCYTES NFR BLD AUTO: 5.5 %
NEUTROPHILS # BLD AUTO: 3.77 X10*3/UL (ref 1.2–7.7)
NEUTROPHILS NFR BLD AUTO: 65.2 %
NRBC BLD-RTO: 0 /100 WBCS (ref 0–0)
PHOSPHATE SERPL-MCNC: 3.9 MG/DL (ref 2.5–4.9)
PLATELET # BLD AUTO: 203 X10*3/UL (ref 150–450)
POTASSIUM SERPL-SCNC: 4.1 MMOL/L (ref 3.5–5.3)
PROT SERPL-MCNC: 7.1 G/DL (ref 6.4–8.2)
RBC # BLD AUTO: 4.15 X10*6/UL (ref 4–5.2)
SODIUM SERPL-SCNC: 140 MMOL/L (ref 136–145)
WBC # BLD AUTO: 5.8 X10*3/UL (ref 4.4–11.3)

## 2024-12-02 PROCEDURE — 80053 COMPREHEN METABOLIC PANEL: CPT

## 2024-12-02 PROCEDURE — 99215 OFFICE O/P EST HI 40 MIN: CPT | Performed by: NURSE PRACTITIONER

## 2024-12-02 PROCEDURE — 36415 COLL VENOUS BLD VENIPUNCTURE: CPT

## 2024-12-02 PROCEDURE — 85025 COMPLETE CBC W/AUTO DIFF WBC: CPT

## 2024-12-02 PROCEDURE — 84100 ASSAY OF PHOSPHORUS: CPT

## 2024-12-02 PROCEDURE — 83001 ASSAY OF GONADOTROPIN (FSH): CPT

## 2024-12-02 PROCEDURE — 82670 ASSAY OF TOTAL ESTRADIOL: CPT

## 2024-12-02 PROCEDURE — 83735 ASSAY OF MAGNESIUM: CPT

## 2024-12-02 ASSESSMENT — PAIN SCALES - GENERAL: PAINLEVEL_OUTOF10: 0-NO PAIN

## 2024-12-02 NOTE — PROGRESS NOTES
"  Breast Medical Oncology Clinic  Location: [facility]     Visit Type: Follow-up Visit    Oncologic History:    3/4/21: Screening Mammo: extremely dense tissue. Right breast calcifications.    3/30/21: Diagnostic B breast Continue yearly mammogram.: large marina like calcifications in lower inner quadrant of right breast.    9/30/21: R breast diagnostic Continue yearly mammogram.: Stable R breast calcifications   4/12/22: Bilateral diagnostic mammogram with targeted right breast US: R breast at 6:00 position 2 CFN, there is a hypoechoic mass, measuring 1.5 X 0.7 X2.5 cm corresponding to mammogram findings. Scanning of right axilla demonstrates no adenopathy. No  evidence of malignancy in the left breast.    4/21/22: R breast US: guided core needle biopsy:    4/28.22:  review of outside images: biopsy proven malignancy at the 6:00 position of the R breast spanning approximately 2.5 cm.    5/20/22: R breast upper inner quadrant MRI guided biopsy    6/8/22: R breast total mastectomy with axillary sentinel LN biopsy. IDC with DCIS. 1.4 cm. 4 negative LN. G3. LVI present. negative margins. pT1cN0. ER positive % NJ positive 81-90% HER2 negative. MammaPrint High risk, luminal B type.  -0.686.    8/2/22- 10/4/22: Adjuvant TC X4   11/2022: Enrolled on NSABP B61 trial- randomized to giradestrant arm    Subjective: Interval History    Patient presents today for breast cancer follow up visit and study visit. Today I have reviewed with the patient I will be conducting a clinical physical breast exam. Patient has declined a second medical professional today during the exam as a chapperone.     She continues compliant on daily giredestrant. She is generally tolerating this well. She has intermittent issues with sleep but attributes this to age and not the drug. She reports tolerable hot flashes. She feels her \"Bone aches\" have improved. She otherwise denies any new, severe or unexplained bone aches or pains    She denies any chest " "pain or breathing issues, no cough or sob. She exercises 5 times a week and denies any breathing issues. She reports a A fib hx since  with ablation in . She has been having on and off clutter again and notices this more when she has not had enough water. She is planning on another ablation in .     She reports a recent misstep and fell down a few stairs- she did complete x-ray at urgent care and this was negative for a left foot fracture- is using ibuprofen and tylenol rotationally for tenderness in her foot. She denies any vision changes or headache issues, dizziness. She denies any issues with neuropathy from prior chemotherapy.     She denies any skin lesions or masses, oral sores lesions or infections    She reports a normal appetite and normal bowel movements, urination and sexual functioning.     She denies any issues significant fatigue      Gynecologic History:     Menarche 11  Menopause 52  , 25 years old first delivery  Did not BF  No OCP or HRT  Used Clomid     Pertinent Family history:    No family history of breast or ovarian cancer  Sister had uterine and cervical cancer  Mother with lung cancer    Social History  Eloina Mejia  reports that she has never smoked. She has never used smokeless tobacco.  She  reports current alcohol use.  She  reports no history of drug use.    ROS:     ROS 14 points performed, See HPI for exceptions         Physical Examination:    /79   Pulse 63   Temp 36.4 °C (97.5 °F) (Temporal)   Resp 18   Ht 1.682 m (5' 6.22\")   Wt 63.6 kg (140 lb 3.4 oz)   SpO2 98%   BMI 22.48 kg/m²       Physical Exam  Constitutional: Well developed, awake/alert/oriented x4, no distress, alert and cooperative  EYES: Sclera clear  ENMT: mucous membranes moist, no apparent injury, no lesions seen  Head/Neck: Neck supple, no apparent injury, thyroid without mass or tenderness, No JVD, trachea midline, no bruits  Respiratory / Thoracic: Patent airways, clear to all lobes, " normal breath sounds with good chest expansion, thorax symmetric.  Cardiovascular: Regular, rate and rhythm, no murmurs, 2+ equal pulses of the extremities, normal auscultated S 1and S 2  GI: Nondistended, soft, non-tender, no rebound tenderness or guarding, no masses palpable, no organomegaly, +BS, no bruits  Musculoskeletal: ROM intact, no joint swelling, normal strength, no spinal tenderness  Extremities: normal extremities, no cyanosis edema, contusions or wounds, no clubbing  Neurological: alert and oriented x4, intact senses, motor, response and reflexes, normal strength  Breast: Hx right mastectomy. No palpable masses or lesions in right chest wall or left breast    Lymphatic: No cervical, supraclavicular, infraclavicular or axillary lymphadenopathy  Psychological: Appropriate and talkative mood and behavior  Skin: Warm and dry, no lesions, no rashes, no jaundice    Results:    Lab Results   Component Value Date    WBC 5.8 12/02/2024    HGB 13.9 12/02/2024    HCT 40.5 12/02/2024    MCV 98 12/02/2024     12/02/2024          Chemistry    Lab Results   Component Value Date/Time     12/02/2024 1409    K 4.1 12/02/2024 1409     12/02/2024 1409    CO2 30 12/02/2024 1409    BUN 11 12/02/2024 1409    CREATININE 0.67 12/02/2024 1409    Lab Results   Component Value Date/Time    CALCIUM 9.8 12/02/2024 1409    ALKPHOS 51 12/02/2024 1409    AST 17 12/02/2024 1409    ALT 15 12/02/2024 1409    BILITOT 0.6 12/02/2024 1409           === 10/08/24 ===    DEXA BONE DENSITY    - Impression -  DEXA:  According to World Health Organization criteria,  classification is low bone mass (osteopenia)    Followup recommended in two years or sooner as clinically warranted.    All images and detailed analysis are available on the  Radiology  PACS.    MACRO:  None    Signed by: Bobby Hidalgo 11/12/2024 11:59 AM  Dictation workstation:   [id number]      ECOG Performance Status:     [x] 0 Fully active, able to carry on  all pre-disease performance without restriction  [] 1 Restricted in physically strenuous activity but ambulatory and able to carry out work of a light or sedentary nature, e.g., light house work, office work  [] 2 Ambulatory and capable of all selfcare but unable to carry out any work activities; up and about more than 50% of waking hours  [] 3 Capable of only limited selfcare; confined to bed or chair more than 50% of waking hours  [] 4 Completely disabled; cannot carry on any selfcare; totally confined to bed or chair  [] 5 Dead       Assessment:     Pathologic prognostic stage IA (pT1c pN0 cM0) invasive ductal carcinoma of the R breast; Dx in April 2022; Grade 3; ER positive (%), MA positive (81-90%),  HER2 negative; High risk MammaPrint; S/p R mastectomy and SLNbx. S/p adjuvant TCX4. On giredestrant on NSABP B61 trial.     Eloina is a very pleasant post-menopausal woman with other history of HTN and IBS. Tolerating treatment well with study drug Giredrestrant.     No evidence of breast cancer recurrence per patient history, physical examination and imaging findings.     Plan:    Surgical Plan: S/p R mastectomy and SLNbx  Additional biopsy: Not indicated  Radiation Plan: Not indicated  Additional staging scans/DEXA/echo: Staging scans not indicated based on current stage, patient history and physical examination. Dexa from 11/2024  with resolve of osteoporosis, now osteopenia T-score -2.3.   Additional Path info (i.e Ki67, PDL1): Not indicated  Gene assays: MammaPrint high risk     Systemic treatment plan: Giredestrant                Intent: Curative             Clinical trial: Enrolled on [id number]: “A Phase III, Randomized, Open-Label, Multicenter Study Evaluating The Efficacy And Safety  Of Adjuvant Giredestrant Compared With Physician's Choice Of Adjuvant Endocrine Monotherapy In Patients With Estrogen Receptor-Positive, Her2- Negative Early Breast Cancer”                 Endocrine therapy: Trial with  Giredestrant                HER2 treatment: Not indicated                Targeted agents: Not indicated                Chemotherapy: TC x4 completed 10/4/22                BMA: Receiving zometa every 6 months for 3 years. 5th dose scheduled for 1/22/25     Access: Not indicated  Supportive meds: not indicated  Genetic testing: Not indicated  Fertility preservation: Not indicated  Other active problems/orders:       Osteoporosis: On zometa - general recommendations for bone loss prevention which include 7806-2507 mg of calcium intake per day for adults  >50yrs, and no history of renal calculi; 800-1000 IU of vitamin D3 per day for adults >50yrs, and no history of renal calculi. Weight bearing exercise. Discontinue smoking. Avoid excessive use of caffeine, soft drinks, and alcoholic beverages. In addition,  balance training and fall prevention programs can help reduce the risk of fractures. Next DEXA due in 8/2023, monitored by PCP.     Surveillance plan: Yearly mammogram of L breast- next due with Alexandria Quispe 1/13/25    Follow-up: Per trial protocol. Cycle 5 of 6 Zometa is due Jan 2025.     At least 35 minutes of direct consultation was spent with the patient today reviewing her cancer care plan, educating and answering questions regarding ongoing follow up, greater than 50% in counseling and coordination of care      Thank you for the opportunity to be involved your care.   We discussed the clinical significance of diagnosis, goals of care and treatment plan in detail.   Please do not hesitate to reach out with any questions.         Key Moreno MSN, APRN, FNP-C  [facility]  Division of Medical Oncology- Breast   Collaborating Physician Dr. Binh Krishnan   [facility] [place] [address]  Phone: [phone]  Fax: [phone]  Available via Secure Chat    Confidential Peer Review Document-   Privilege  Privileged Pursuant to [place] Revised Code Section 2305.24, .25, .251 & .252

## 2024-12-02 NOTE — PATIENT INSTRUCTIONS
Next follow up Dr Kamara per protocol Feb 28 2025    #5 of 6 Zometa 1/22/25, labs the same day    Mammogram and follow up 1/13/25 with Alexandria Quisep CNP    Last Bone density 11/2024 showed Osteopenia. Will repeat around Dec 2026.    [facility]P [facility] as scheduled     Please call [phone] with any questions or concerns prior to your next office visit.

## 2024-12-09 ENCOUNTER — APPOINTMENT (OUTPATIENT)
Dept: HEMATOLOGY/ONCOLOGY | Facility: CLINIC | Age: 64
End: 2024-12-09
Payer: MEDICARE

## 2025-01-07 ENCOUNTER — TELEPHONE (OUTPATIENT)
Dept: ADMISSION | Facility: HOSPITAL | Age: 65
End: 2025-01-07
Payer: MEDICARE

## 2025-01-07 DIAGNOSIS — Z17.0 MALIGNANT NEOPLASM OF RIGHT BREAST IN FEMALE, ESTROGEN RECEPTOR POSITIVE, UNSPECIFIED SITE OF BREAST: ICD-10-CM

## 2025-01-07 DIAGNOSIS — C50.911 MALIGNANT NEOPLASM OF RIGHT BREAST IN FEMALE, ESTROGEN RECEPTOR POSITIVE, UNSPECIFIED SITE OF BREAST: ICD-10-CM

## 2025-01-07 DIAGNOSIS — C50.919 MALIGNANT NEOPLASM OF FEMALE BREAST, UNSPECIFIED ESTROGEN RECEPTOR STATUS, UNSPECIFIED LATERALITY, UNSPECIFIED SITE OF BREAST: Primary | ICD-10-CM

## 2025-01-07 NOTE — TELEPHONE ENCOUNTER
Pt requesting appt for zometa injection on or after 2/7 based on last injection 8/7.   Infusion request placed. Orders are needed.

## 2025-01-10 NOTE — PROGRESS NOTES
Eloina Mejia female   1960 64 y.o.   [id number]      Chief Complaint    Follow-up      BREAST CANCER DIAGNOSIS:   Breast         AJCC Edition: 8th (AJCC), Diagnosis Date: 2023, Stage, pT1c cN0 cM0  G3    HPI  Eloina Mejia is a 64 y.o.   female presenting to the [facility] for routine follow up. She was diagnosed at outside facility with screen detected right breast poorly differentiated invasive ductal carcinoma, ER % positive, IL 81-90%, HER2 equivocal, negative by FISH. She underwent right breast total mastectomy, axillary sentinel lymph node biopsy on 2022. Final pathology demonstrated 1.4cm invasive ductal carcinoma and ductal carcinoma in situ, grade 3, suspicious for lymphatic vessel invasion, negative margins, 4 axillary sentinel lymph nodes negative for carcinoma (0/4), pT1c N0 M0. . Mammaprint -0.686, high risk luminal B.     She completed adjuvant chemotherapy TC x 4 2022 - 10/4/2022 under direction of Dr. Kamara. She enrolled in NSABP B61 trial in 2022 on giredestrant orally daily. She notes decreased libido.      BREAST IMAGIN2024 Fast breast MRI, BI-RADS Category 1. 2024 left diagnostic mammogram, BI-RADS Category 1.      BREAST PROCEDURE: 2022 Right breast ultrasound guided core biopsy at 6:00, 2cm from nipple ([facility]). 2022 Right breast upper inner quadrant [facility] 2022 Right breast total mastectomy with axillary sentinel lymph node biopsy ([facility]).     REPRODUCTIVE HEALTH: menarche at 11, , age first birth 25, no OCPs post menopausal estrogen, or history of breast feeding, menopause age 52     MEDICAL HISTORY: paroxysmal a fib/flutter s/p flutter ablation, osteoporosis, h/o BCC     FAMILY CANCER HISTORY: Mother diagnosed with lung cancer. Sister diagnosed with uterine cancer and cervical cancer, no breast or ovarian cancers     MEDICAL ONCOLOGY: Dr. Kamara: adjuvant chemotherapy TC x4  from 8/2/2022 -10/4/2022. Enrolled in NSABP B61 trial - randomized to giradestrant arm.         REVIEW OF SYSTEMS    Constitutional:  Negative for appetite change, fatigue, fever and unexpected weight change.   HENT:  Negative for ear pain, hearing loss, nosebleeds, sore throat and trouble swallowing.    Eyes:  Negative for discharge, itching and visual disturbance.   Respiratory:  Negative for cough, chest tightness and shortness of breath.    Cardiovascular:  Negative for chest pain, palpitations and leg swelling.   Breast: as indicated in HPI  Gastrointestinal:  Negative for abdominal pain, constipation, diarrhea and nausea.   Endocrine: Negative for cold intolerance and heat intolerance.   Genitourinary:  Negative for dysuria, frequency, hematuria, pelvic pain and vaginal bleeding.   Musculoskeletal:  Negative for arthralgias, back pain, gait problem, joint swelling and myalgias.   Skin:  Negative for color change and rash.   Allergic/Immunologic: Negative for environmental allergies and food allergies.   Neurological:  Negative for dizziness, tremors, speech difficulty, weakness, numbness and headaches.   Hematological:  Does not bruise/bleed easily.   Psychiatric/Behavioral:  Negative for agitation, dysphoric mood and sleep disturbance. The patient is not nervous/anxious.         MEDICATIONS  Current Outpatient Medications   Medication Instructions    acetaminophen (TYLENOL 8 HOUR) 650 mg, Every 8 hours PRN    biotin 1 mg tablet 1 tablet, Daily    calcium-minerals-D3-K2-silicon 200 mg calcium- 200 unit tablet Vit Calcium Citrate + D Active    cholecalciferol (VITAMIN D-3) 2,000 Units    melatonin 5 mg, As needed    Study [id number] giredestrant 30 mg, oral, Daily, Take approximately the same time every day; can be taken with or without food; take with a full glass of water.        ALLERGIES  No Known Allergies     Patient Active Problem List    Diagnosis Date Noted    Osteopenia 12/02/2024    Examination of  participant in clinical trial 12/02/2024    Encounter for monitoring bisphosphonate therapy 12/02/2024    Trigger finger of left thumb 02/22/2024    Acquired trigger finger of left middle finger 02/22/2024    Encounter for follow-up surveillance of breast cancer 01/12/2024    History of malignant neoplasm of breast 01/08/2024    Low blood glucose measurement 12/27/2023    Diffuse cystic mastopathy 12/04/2023    Lumbago 12/04/2023    Malignant neoplasm of unspecified site of right female breast 12/04/2023    Adenocarcinoma of breast, right (Multi) 12/04/2023    Low back pain 12/04/2023    Fibrocystic breast changes 12/04/2023    Breast cancer (Multi) 09/26/2023    Healthcare maintenance 09/11/2023    Acquired absence of genital organs 01/10/2023    Personal history of malignant neoplasm of breast 01/10/2023    Acquired absence of other genital organ(s) 01/10/2023    Basal cell carcinoma (BCC) of skin of left upper extremity including shoulder 05/13/2021    Basal cell carcinoma (BCC) of upper extremity 05/13/2021    History of HPV infection 08/17/2017    History of infection due to human papilloma virus (HPV) 08/17/2017    Palpitation 02/24/2017    S/P ablation of atrial flutter 02/24/2017    Palpitations 02/24/2017    History of atrial flutter 02/24/2017    Post-menopause bleeding 07/02/2015    Postmenopausal bleeding 07/02/2015    Atrial flutter (Multi) 05/11/2009    Physical therapy evaluation, initial 01/31/2006    Trigger thumb of left hand 02/22/2024    Trigger finger, left middle finger 02/22/2024     Past Medical History:   Diagnosis Date    Atrial flutter (Multi)     Basal cell carcinoma     Breast cancer (Multi)     right    Fibrocystic breast 1998    Hx antineoplastic chemo 8/2022-10/2022    Low back pain     Palpitations     PONV (postoperative nausea and vomiting)       Past Surgical History:   Procedure Laterality Date    BI US GUIDED BREAST LOCALIZATION AND BIOPSY RIGHT Right 04/21/2022    BI US GUIDED  BREAST LOCALIZATION AND BIOPSY RIGHT 4/21/2022    BREAST BIOPSY  4/2022    CARDIAC ELECTROPHYSIOLOGY STUDY AND ABLATION      COLONOSCOPY      MASTECTOMY Right     with chemo    OTHER SURGICAL HISTORY  05/04/2022    Breast biopsy core needle    OTHER SURGICAL HISTORY  06/20/2022    Right mastectomy      Family History   Problem Relation Name Age of Onset    Lung cancer Mother      Heart attack Father      Diabetes Sister Susan Cerna     Uterine cancer Sister Susan Cerna     Diabetes Brother            SOCIAL HISTORY      Social History     Tobacco Use    Smoking status: Never    Smokeless tobacco: Never   Substance Use Topics    Alcohol use: Yes     Comment: rarely        VITALS  Vitals:    01/13/25 1158   BP: 110/69   Pulse: 63   Temp: 36.1 °C (97 °F)        PHYSICAL EXAM  Patient is alert and oriented x3, with appropriate mood. The gait is steady and hand grasps are equal. Sclera clear. The right breast is removed with healed mastectomy incision. The left tissue is soft without palpable abnormalities, discrete nodules or masses. The skin and nipple appear normal. There is no cervical, supraclavicular, or axillary lymphadenopathy palpable. Heart rate and rhythm normal, S1 and S2 appreciated. The lungs are clear bilaterally. Abdomen is soft & non-tender.    Physical Exam  Chest:              IMAGING  Screening mammogram today, results are pending  Time was spent viewing digital images of the radiology testing with the patient.       ORDERS  Orders Placed This Encounter   Procedures    BI mammo left screening tomosynthesis     Standing Status:   Future     Standing Expiration Date:   3/10/2026     Order Specific Question:   Reason for exam:     Answer:   screening mammogram     Order Specific Question:   Radiologist to Determine Optimal Study     Answer:   Yes     Order Specific Question:   Release result to [facility]     Answer:   Immediate [1]     Order Specific Question:   Is this exam part of a Research Study? If Yes, link  this order to the research study     Answer:   No          ASSESSMENT/PLAN  1. Encounter for screening mammogram for malignant neoplasm of breast  BI mammo left screening tomosynthesis      2. Encounter for follow-up surveillance of breast cancer  Clinic Appointment Request    Clinic Appointment Request             Follow up in about 1 year (around 1/13/2026) for with or after recommended imaging. Continue giredestrant 30mg daily.   Clinical examination and imaging is normal. Please return one year for mammogram and office visit or sooner if you having any problems or concerns.     You can see your health information, review clinical summaries from office visits & test results online when you follow your health with MY  Chart, a personal health record. To sign up go to [email or web address]). If you need assistance with signing up or trouble getting into your account call [employer] Patient Line 24/7 at [phone].     My office phone number is [phone] if you need to get in touch with me or have additional questions or problems. Thank you for choosing [facility] and trusting me as your healthcare provider. I look forward to seeing you again at your next office visit. I am honored to be a provider on your health care team and I remain dedicated to helping you achieve your health goals.        Alexandria Quispe, VIKAS-CNP  [facility] [place]

## 2025-01-13 ENCOUNTER — HOSPITAL ENCOUNTER (OUTPATIENT)
Dept: RADIOLOGY | Facility: CLINIC | Age: 65
Discharge: HOME | End: 2025-01-13
Payer: MEDICARE

## 2025-01-13 ENCOUNTER — OFFICE VISIT (OUTPATIENT)
Dept: SURGICAL ONCOLOGY | Facility: CLINIC | Age: 65
End: 2025-01-13
Payer: MEDICARE

## 2025-01-13 VITALS — WEIGHT: 135 LBS | HEIGHT: 66 IN | BODY MASS INDEX: 21.69 KG/M2

## 2025-01-13 VITALS
HEART RATE: 63 BPM | WEIGHT: 135 LBS | SYSTOLIC BLOOD PRESSURE: 110 MMHG | BODY MASS INDEX: 21.65 KG/M2 | TEMPERATURE: 97 F | DIASTOLIC BLOOD PRESSURE: 69 MMHG

## 2025-01-13 DIAGNOSIS — Z12.31 ENCOUNTER FOR SCREENING MAMMOGRAM FOR MALIGNANT NEOPLASM OF BREAST: Primary | ICD-10-CM

## 2025-01-13 DIAGNOSIS — Z85.3 ENCOUNTER FOR FOLLOW-UP SURVEILLANCE OF BREAST CANCER: ICD-10-CM

## 2025-01-13 DIAGNOSIS — Z08 ENCOUNTER FOR FOLLOW-UP SURVEILLANCE OF BREAST CANCER: ICD-10-CM

## 2025-01-13 DIAGNOSIS — Z00.00 HEALTHCARE MAINTENANCE: ICD-10-CM

## 2025-01-13 PROCEDURE — 77063 BREAST TOMOSYNTHESIS BI: CPT | Mod: LEFT SIDE | Performed by: RADIOLOGY

## 2025-01-13 PROCEDURE — 77067 SCR MAMMO BI INCL CAD: CPT | Mod: LEFT SIDE | Performed by: RADIOLOGY

## 2025-01-13 PROCEDURE — 1160F RVW MEDS BY RX/DR IN RCRD: CPT | Performed by: NURSE PRACTITIONER

## 2025-01-13 PROCEDURE — 77067 SCR MAMMO BI INCL CAD: CPT | Mod: 52,LT

## 2025-01-13 PROCEDURE — 1036F TOBACCO NON-USER: CPT | Performed by: NURSE PRACTITIONER

## 2025-01-13 PROCEDURE — 99214 OFFICE O/P EST MOD 30 MIN: CPT | Performed by: NURSE PRACTITIONER

## 2025-01-13 PROCEDURE — 1159F MED LIST DOCD IN RCRD: CPT | Performed by: NURSE PRACTITIONER

## 2025-01-13 PROCEDURE — 1126F AMNT PAIN NOTED NONE PRSNT: CPT | Performed by: NURSE PRACTITIONER

## 2025-01-13 ASSESSMENT — PATIENT HEALTH QUESTIONNAIRE - PHQ9
SUM OF ALL RESPONSES TO PHQ9 QUESTIONS 1 & 2: 0
1. LITTLE INTEREST OR PLEASURE IN DOING THINGS: NOT AT ALL
2. FEELING DOWN, DEPRESSED OR HOPELESS: NOT AT ALL

## 2025-01-13 ASSESSMENT — PAIN SCALES - GENERAL: PAINLEVEL_OUTOF10: 0-NO PAIN

## 2025-01-22 ENCOUNTER — APPOINTMENT (OUTPATIENT)
Dept: HEMATOLOGY/ONCOLOGY | Facility: CLINIC | Age: 65
End: 2025-01-22
Payer: MEDICARE

## 2025-02-10 ENCOUNTER — APPOINTMENT (OUTPATIENT)
Dept: HEMATOLOGY/ONCOLOGY | Facility: CLINIC | Age: 65
End: 2025-02-10
Payer: MEDICARE

## 2025-02-19 DIAGNOSIS — C50.919 MALIGNANT NEOPLASM OF BREAST IN FEMALE, ESTROGEN RECEPTOR POSITIVE, UNSPECIFIED LATERALITY, UNSPECIFIED SITE OF BREAST: ICD-10-CM

## 2025-02-19 DIAGNOSIS — C50.911 MALIGNANT NEOPLASM OF RIGHT BREAST IN FEMALE, ESTROGEN RECEPTOR POSITIVE, UNSPECIFIED SITE OF BREAST: Primary | ICD-10-CM

## 2025-02-19 DIAGNOSIS — Z17.0 MALIGNANT NEOPLASM OF BREAST IN FEMALE, ESTROGEN RECEPTOR POSITIVE, UNSPECIFIED LATERALITY, UNSPECIFIED SITE OF BREAST: ICD-10-CM

## 2025-02-19 DIAGNOSIS — Z17.0 MALIGNANT NEOPLASM OF RIGHT BREAST IN FEMALE, ESTROGEN RECEPTOR POSITIVE, UNSPECIFIED SITE OF BREAST: Primary | ICD-10-CM

## 2025-02-20 DIAGNOSIS — C44.619 BASAL CELL CARCINOMA (BCC) OF SKIN OF LEFT UPPER EXTREMITY INCLUDING SHOULDER: ICD-10-CM

## 2025-02-20 DIAGNOSIS — Z85.3 HISTORY OF MALIGNANT NEOPLASM OF BREAST: ICD-10-CM

## 2025-02-24 ENCOUNTER — APPOINTMENT (OUTPATIENT)
Dept: HEMATOLOGY/ONCOLOGY | Facility: CLINIC | Age: 65
End: 2025-02-24
Payer: MEDICARE

## 2025-02-24 ENCOUNTER — LAB (OUTPATIENT)
Dept: LAB | Facility: CLINIC | Age: 65
End: 2025-02-24
Payer: MEDICARE

## 2025-02-24 ENCOUNTER — INFUSION (OUTPATIENT)
Dept: HEMATOLOGY/ONCOLOGY | Facility: CLINIC | Age: 65
End: 2025-02-24
Payer: MEDICARE

## 2025-02-24 ENCOUNTER — OFFICE VISIT (OUTPATIENT)
Dept: HEMATOLOGY/ONCOLOGY | Facility: CLINIC | Age: 65
End: 2025-02-24
Payer: MEDICARE

## 2025-02-24 VITALS
SYSTOLIC BLOOD PRESSURE: 147 MMHG | HEART RATE: 75 BPM | BODY MASS INDEX: 21.95 KG/M2 | WEIGHT: 136.02 LBS | DIASTOLIC BLOOD PRESSURE: 82 MMHG | OXYGEN SATURATION: 98 % | RESPIRATION RATE: 16 BRPM | TEMPERATURE: 97 F

## 2025-02-24 DIAGNOSIS — C50.911 MALIGNANT NEOPLASM OF RIGHT BREAST IN FEMALE, ESTROGEN RECEPTOR POSITIVE, UNSPECIFIED SITE OF BREAST: ICD-10-CM

## 2025-02-24 DIAGNOSIS — C50.919 MALIGNANT NEOPLASM OF FEMALE BREAST, UNSPECIFIED ESTROGEN RECEPTOR STATUS, UNSPECIFIED LATERALITY, UNSPECIFIED SITE OF BREAST: ICD-10-CM

## 2025-02-24 DIAGNOSIS — Z85.3 HISTORY OF MALIGNANT NEOPLASM OF BREAST: ICD-10-CM

## 2025-02-24 DIAGNOSIS — Z17.0 MALIGNANT NEOPLASM OF RIGHT BREAST IN FEMALE, ESTROGEN RECEPTOR POSITIVE, UNSPECIFIED SITE OF BREAST: ICD-10-CM

## 2025-02-24 DIAGNOSIS — Z17.0 MALIGNANT NEOPLASM OF BREAST IN FEMALE, ESTROGEN RECEPTOR POSITIVE, UNSPECIFIED LATERALITY, UNSPECIFIED SITE OF BREAST: ICD-10-CM

## 2025-02-24 DIAGNOSIS — C50.919 MALIGNANT NEOPLASM OF BREAST IN FEMALE, ESTROGEN RECEPTOR POSITIVE, UNSPECIFIED LATERALITY, UNSPECIFIED SITE OF BREAST: ICD-10-CM

## 2025-02-24 LAB
ALBUMIN SERPL BCP-MCNC: 4.4 G/DL (ref 3.4–5)
ALP SERPL-CCNC: 61 U/L (ref 33–136)
ALT SERPL W P-5'-P-CCNC: 15 U/L (ref 7–45)
ANION GAP SERPL CALC-SCNC: 9 MMOL/L (ref 10–20)
AST SERPL W P-5'-P-CCNC: 16 U/L (ref 9–39)
BASOPHILS # BLD AUTO: 0.03 X10*3/UL (ref 0–0.1)
BASOPHILS NFR BLD AUTO: 0.7 %
BILIRUB SERPL-MCNC: 0.5 MG/DL (ref 0–1.2)
BUN SERPL-MCNC: 11 MG/DL (ref 6–23)
CALCIUM SERPL-MCNC: 9.4 MG/DL (ref 8.6–10.3)
CHLORIDE SERPL-SCNC: 104 MMOL/L (ref 98–107)
CHOLEST SERPL-MCNC: 186 MG/DL (ref 0–199)
CHOLESTEROL/HDL RATIO: 3.6
CO2 SERPL-SCNC: 30 MMOL/L (ref 21–32)
CREAT SERPL-MCNC: 0.62 MG/DL (ref 0.5–1.05)
EGFRCR SERPLBLD CKD-EPI 2021: >90 ML/MIN/1.73M*2
EOSINOPHIL # BLD AUTO: 0.11 X10*3/UL (ref 0–0.7)
EOSINOPHIL NFR BLD AUTO: 2.6 %
ERYTHROCYTE [DISTWIDTH] IN BLOOD BY AUTOMATED COUNT: 12.2 % (ref 11.5–14.5)
ESTRADIOL SERPL-MCNC: <19 PG/ML
FSH SERPL-ACNC: 39 IU/L
GLUCOSE SERPL-MCNC: 86 MG/DL (ref 74–99)
HCT VFR BLD AUTO: 40.2 % (ref 36–46)
HDLC SERPL-MCNC: 51.2 MG/DL
HGB BLD-MCNC: 13.9 G/DL (ref 12–16)
IMM GRANULOCYTES # BLD AUTO: 0.01 X10*3/UL (ref 0–0.7)
IMM GRANULOCYTES NFR BLD AUTO: 0.2 % (ref 0–0.9)
LH SERPL-ACNC: 19 IU/L
LYMPHOCYTES # BLD AUTO: 1.26 X10*3/UL (ref 1.2–4.8)
LYMPHOCYTES NFR BLD AUTO: 30.2 %
MCH RBC QN AUTO: 33.8 PG (ref 26–34)
MCHC RBC AUTO-ENTMCNC: 34.6 G/DL (ref 32–36)
MCV RBC AUTO: 98 FL (ref 80–100)
MONOCYTES # BLD AUTO: 0.32 X10*3/UL (ref 0.1–1)
MONOCYTES NFR BLD AUTO: 7.7 %
NEUTROPHILS # BLD AUTO: 2.44 X10*3/UL (ref 1.2–7.7)
NEUTROPHILS NFR BLD AUTO: 58.6 %
NON-HDL CHOLESTEROL: 135 MG/DL (ref 0–149)
NRBC BLD-RTO: 0 /100 WBCS (ref 0–0)
PLATELET # BLD AUTO: 225 X10*3/UL (ref 150–450)
POTASSIUM SERPL-SCNC: 4.1 MMOL/L (ref 3.5–5.3)
PROT SERPL-MCNC: 6.8 G/DL (ref 6.4–8.2)
RBC # BLD AUTO: 4.11 X10*6/UL (ref 4–5.2)
SODIUM SERPL-SCNC: 139 MMOL/L (ref 136–145)
WBC # BLD AUTO: 4.2 X10*3/UL (ref 4.4–11.3)

## 2025-02-24 PROCEDURE — 99215 OFFICE O/P EST HI 40 MIN: CPT | Performed by: STUDENT IN AN ORGANIZED HEALTH CARE EDUCATION/TRAINING PROGRAM

## 2025-02-24 PROCEDURE — 99215 OFFICE O/P EST HI 40 MIN: CPT | Mod: 25 | Performed by: STUDENT IN AN ORGANIZED HEALTH CARE EDUCATION/TRAINING PROGRAM

## 2025-02-24 PROCEDURE — 80053 COMPREHEN METABOLIC PANEL: CPT

## 2025-02-24 PROCEDURE — 82670 ASSAY OF TOTAL ESTRADIOL: CPT | Mod: PARLAB

## 2025-02-24 PROCEDURE — 36415 COLL VENOUS BLD VENIPUNCTURE: CPT

## 2025-02-24 PROCEDURE — 96365 THER/PROPH/DIAG IV INF INIT: CPT | Mod: INF

## 2025-02-24 PROCEDURE — 85610 PROTHROMBIN TIME: CPT

## 2025-02-24 PROCEDURE — 85025 COMPLETE CBC W/AUTO DIFF WBC: CPT

## 2025-02-24 PROCEDURE — 83718 ASSAY OF LIPOPROTEIN: CPT

## 2025-02-24 PROCEDURE — 1159F MED LIST DOCD IN RCRD: CPT | Performed by: STUDENT IN AN ORGANIZED HEALTH CARE EDUCATION/TRAINING PROGRAM

## 2025-02-24 PROCEDURE — 83002 ASSAY OF GONADOTROPIN (LH): CPT | Mod: PARLAB

## 2025-02-24 PROCEDURE — 2500000004 HC RX 250 GENERAL PHARMACY W/ HCPCS (ALT 636 FOR OP/ED): Performed by: STUDENT IN AN ORGANIZED HEALTH CARE EDUCATION/TRAINING PROGRAM

## 2025-02-24 PROCEDURE — 1126F AMNT PAIN NOTED NONE PRSNT: CPT | Performed by: STUDENT IN AN ORGANIZED HEALTH CARE EDUCATION/TRAINING PROGRAM

## 2025-02-24 RX ORDER — HEPARIN SODIUM,PORCINE/PF 10 UNIT/ML
50 SYRINGE (ML) INTRAVENOUS AS NEEDED
Status: CANCELLED | OUTPATIENT
Start: 2025-02-24

## 2025-02-24 RX ORDER — HEPARIN 100 UNIT/ML
500 SYRINGE INTRAVENOUS AS NEEDED
OUTPATIENT
Start: 2025-02-24

## 2025-02-24 RX ORDER — HEPARIN 100 UNIT/ML
500 SYRINGE INTRAVENOUS AS NEEDED
Status: CANCELLED | OUTPATIENT
Start: 2025-02-24

## 2025-02-24 RX ORDER — FAMOTIDINE 10 MG/ML
20 INJECTION, SOLUTION INTRAVENOUS ONCE AS NEEDED
OUTPATIENT
Start: 2025-05-18

## 2025-02-24 RX ORDER — HEPARIN SODIUM,PORCINE/PF 10 UNIT/ML
50 SYRINGE (ML) INTRAVENOUS AS NEEDED
OUTPATIENT
Start: 2025-02-24

## 2025-02-24 RX ORDER — ALBUTEROL SULFATE 0.83 MG/ML
3 SOLUTION RESPIRATORY (INHALATION) AS NEEDED
Status: DISCONTINUED | OUTPATIENT
Start: 2025-02-24 | End: 2025-02-24 | Stop reason: HOSPADM

## 2025-02-24 RX ORDER — ALBUTEROL SULFATE 0.83 MG/ML
3 SOLUTION RESPIRATORY (INHALATION) AS NEEDED
OUTPATIENT
Start: 2025-05-18

## 2025-02-24 RX ORDER — EPINEPHRINE 0.3 MG/.3ML
0.3 INJECTION SUBCUTANEOUS EVERY 5 MIN PRN
Status: DISCONTINUED | OUTPATIENT
Start: 2025-02-24 | End: 2025-02-24 | Stop reason: HOSPADM

## 2025-02-24 RX ORDER — FAMOTIDINE 10 MG/ML
20 INJECTION, SOLUTION INTRAVENOUS ONCE AS NEEDED
Status: DISCONTINUED | OUTPATIENT
Start: 2025-02-24 | End: 2025-02-24 | Stop reason: HOSPADM

## 2025-02-24 RX ORDER — DIPHENHYDRAMINE HYDROCHLORIDE 50 MG/ML
50 INJECTION INTRAMUSCULAR; INTRAVENOUS AS NEEDED
OUTPATIENT
Start: 2025-05-18

## 2025-02-24 RX ORDER — ZOLEDRONIC ACID 0.04 MG/ML
4 INJECTION, SOLUTION INTRAVENOUS ONCE
OUTPATIENT
Start: 2025-05-18

## 2025-02-24 RX ORDER — EPINEPHRINE 0.3 MG/.3ML
0.3 INJECTION SUBCUTANEOUS EVERY 5 MIN PRN
OUTPATIENT
Start: 2025-05-18

## 2025-02-24 RX ORDER — ZOLEDRONIC ACID 0.04 MG/ML
4 INJECTION, SOLUTION INTRAVENOUS ONCE
Status: COMPLETED | OUTPATIENT
Start: 2025-02-24 | End: 2025-02-24

## 2025-02-24 RX ORDER — DIPHENHYDRAMINE HYDROCHLORIDE 50 MG/ML
50 INJECTION INTRAMUSCULAR; INTRAVENOUS AS NEEDED
Status: DISCONTINUED | OUTPATIENT
Start: 2025-02-24 | End: 2025-02-24 | Stop reason: HOSPADM

## 2025-02-24 RX ADMIN — ZOLEDRONIC ACID 4 MG: 0.04 INJECTION, SOLUTION INTRAVENOUS at 14:48

## 2025-02-24 ASSESSMENT — PAIN SCALES - GENERAL: PAINLEVEL_OUTOF10: 0-NO PAIN

## 2025-02-24 NOTE — PROGRESS NOTES
Breast Medical Oncology Clinic  Location: [place]    Visit Type: Follow-up Visit    Oncologic History:    3/4/21: Screening Mammo: extremely dense tissue. Right breast calcifications.    3/30/21: Diagnostic B breast Continue yearly mammogram.: large marina like calcifications in lower inner quadrant of right breast.    21: R breast diagnostic Continue yearly mammogram.: Stable R breast calcifications   22: Bilateral diagnostic mammogram with targeted right breast US: R breast at 6:00 position 2 CFN, there is a hypoechoic mass, measuring 1.5 X 0.7 X2.5 cm corresponding to mammogram findings. Scanning of right axilla demonstrates no adenopathy. No  evidence of malignancy in the left breast.    22: R breast US: guided core needle biopsy:    .22:  review of outside images: biopsy proven malignancy at the 6:00 position of the R breast spanning approximately 2.5 cm.    22: R breast upper inner quadrant MRI guided biopsy    22: R breast total mastectomy with axillary sentinel LN biopsy. IDC with DCIS. 1.4 cm. 4 negative LN. G3. LVI present. negative margins. pT1cN0. ER positive % KS positive 81-90% HER2 negative. MammaPrint High risk, luminal B type.  -0.686.    22- 10/4/22: Adjuvant TC X4   2022: Enrolled on NSABP B61 trial- randomized to giradestrant arm    Subjective: Interval History    Patient presents today for follow-up visit.  She is accompanied by her .  Denies major interval events since last follow-up. She has a cardiac ablation planned for this Thursday and started on xarelto pre/post-procedure. Remains on giradestrant. Tolerating well. Remains very active. Denies weight loss, changes in the breast and/or chest wall, new aches or pains, changes in appetite or energy level. No current concerns at this time.     Gynecologic History:     Menarche 11  Menopause 52  , 25 years old first delivery  Did not BF  No OCP or HRT  Used Clomid     Pertinent Family  history:    No family history of breast or ovarian cancer  Sister had uterine and cervical cancer  Mother with lung cancer    Social History  Eloina Mejia  reports that she has never smoked. She has never used smokeless tobacco.  She  reports current alcohol use.  She  reports no history of drug use.    ROS:     Review of Systems   All other systems reviewed and are negative.       Physical Examination:    /82   Pulse 75   Temp 36.1 °C (97 °F)   Resp 16   Wt 61.7 kg (136 lb 0.4 oz)   SpO2 98%   BMI 21.95 kg/m²     Physical Exam  Vitals and nursing note reviewed.   Constitutional:       General: She is not in acute distress.     Appearance: Normal appearance. She is not toxic-appearing.   HENT:      Head: Normocephalic and atraumatic.      Mouth/Throat:      Pharynx: Oropharynx is clear.   Eyes:      Extraocular Movements: Extraocular movements intact.      Conjunctiva/sclera: Conjunctivae normal.   Cardiovascular:      Rate and Rhythm: Normal rate and regular rhythm.   Pulmonary:      Effort: Pulmonary effort is normal. No respiratory distress.   Abdominal:      General: Abdomen is flat. Bowel sounds are normal.      Palpations: Abdomen is soft.   Musculoskeletal:         General: No swelling. Normal range of motion.      Cervical back: Normal range of motion.   Skin:     General: Skin is warm and dry.   Neurological:      General: No focal deficit present.      Mental Status: She is alert.   Psychiatric:         Mood and Affect: Mood normal.         Breast Examination:    R chest wall without masses/lesions.   L breast is unremarkable.  No skin masses or nipple changes    ECOG Performance Status:     [x] 0 Fully active, able to carry on all pre-disease performance without restriction  [] 1 Restricted in physically strenuous activity but ambulatory and able to carry out work of a light or sedentary nature, e.g., light house work, office work  [] 2 Ambulatory and capable of all selfcare but unable to  carry out any work activities; up and about more than 50% of waking hours  [] 3 Capable of only limited selfcare; confined to bed or chair more than 50% of waking hours  [] 4 Completely disabled; cannot carry on any selfcare; totally confined to bed or chair  [] 5 Dead     Results:    Labs:  Reviewed    Imaging:  Reviewed    Pathology:  Reviewed    Assessment:     Pathologic prognostic stage IA (pT1c pN0 cM0) invasive ductal carcinoma of the R breast; Dx in April 2022; Grade 3; ER positive (%), MO positive (81-90%),  HER2 negative; High risk MammaPrint; S/p R mastectomy and SLNbx. S/p adjuvant TCX4. On giredestrant on NSABP B61 trial.     Eloina is a very pleasant post-menopausal woman with other history of HTN and IBS. Tolerating treatment well. No evidence of breast cancer recurrence per patient history, physical examination and imaging findings.     Plan:    Surgical Plan: S/p R mastectomy and SLNbx  Additional biopsy: Not indicated  Radiation Plan: Not indicated  Additional staging scans/DEXA/echo: Staging scans not indicated based on current stage, patient history and physical examination.   Additional Path info (i.e Ki67, PDL1): Not indicated  Gene assays: MammaPrint high risk     Systemic treatment plan: Giredestrant                Intent: Curative             Clinical trial: Enrolled on [id number]: “A Phase III, Randomized, Open-Label, Multicenter Study Evaluating The Efficacy And Safety  Of Adjuvant Giredestrant Compared With Physician's Choice Of Adjuvant Endocrine Monotherapy In Patients With Estrogen Receptor-Positive, Her2- Negative Early Breast Cancer”                 Endocrine therapy: Trial with Giredestrant                HER2 treatment: Not indicated                Targeted agents: Not indicated                Chemotherapy: TC x4 completed 10/4/22                BMA: Receiving zometa every 6 months for 3 years.      Access: Not indicated  Supportive meds: not indicated  Genetic testing: Not  indicated  Fertility preservation: Not indicated  Other active problems/orders:       Osteoporosis: On zometa - general recommendations for bone loss prevention which include [id number] mg of calcium intake per day for adults  >50yrs, and no history of renal calculi; 800-1000 IU of vitamin D3 per day for adults >50yrs, and no history of renal calculi. Weight bearing exercise. Discontinue smoking. Avoid excessive use of caffeine, soft drinks, and alcoholic beverages. In addition,  balance training and fall prevention programs can help reduce the risk of fractures. Next DEXA due in 11/2026     Surveillance plan: Yearly mammogram of L breast    Follow-up: Per trial protocol. C6 of 6 Zometa due around 8/11/2025    Patient expressed understanding of the plan outlined above. She had ample time to ask questions. She understands she can contact us should she have additional questions or issues arise in the interim.

## 2025-02-24 NOTE — PROGRESS NOTES
Patient is here today for Zometa infusion and MD visit - no complication since last being seen.  B/L lung sounds not auscultated.  Denies current chest pain. No acute distress noted.  Patient verbalizes understanding of plan of care.  Patient tolerated infusion well.  Ambulated off unit - gait steady.  no complaints. Call back instructions reviewed.  Verbalized understanding.

## 2025-02-24 NOTE — PROGRESS NOTES
Research Note Treatment Day    Eloina Mejia is here today for treatment on [id number]. Today is C30D1. Procedures completed per protocol. AE's and con-meds reviewed with patient. Patient is aware of treatment plan. Giredestrant returned and old pill diary collected. Dispensed new dose of Giredestrant and pill diary. Ok per Dr. Kamara. RTC in 3 months or sooner if needed. Will follow up at that time.     [x]   Received treatment as planned        Education Documentation  Treatment Plan and Schedule, taught by Mark Delacruz RN at 2/24/2025  3:03 PM.  Learner: Significant Other, Patient  Readiness: Eager  Method: Explanation  Response: Verbalizes Understanding    Diagnostic Studies, taught by Mark Delacruz RN at 2/24/2025  3:03 PM.  Learner: Significant Other, Patient  Readiness: Eager  Method: Explanation  Response: Verbalizes Understanding    Education Comments  No comments found.

## 2025-02-25 LAB
APTT PPP: 36 SECONDS (ref 27–38)
INR PPP: 1.1 (ref 0.9–1.1)
PROTHROMBIN TIME: 12.2 SECONDS (ref 9.8–12.8)

## 2025-05-16 ENCOUNTER — TELEPHONE (OUTPATIENT)
Dept: OTHER | Facility: HOSPITAL | Age: 65
End: 2025-05-16
Payer: MEDICARE

## 2025-05-16 DIAGNOSIS — Z17.0 MALIGNANT NEOPLASM OF BREAST IN FEMALE, ESTROGEN RECEPTOR POSITIVE, UNSPECIFIED LATERALITY, UNSPECIFIED SITE OF BREAST: Primary | ICD-10-CM

## 2025-05-16 DIAGNOSIS — Z85.3 HISTORY OF MALIGNANT NEOPLASM OF BREAST: ICD-10-CM

## 2025-05-16 DIAGNOSIS — C50.919 MALIGNANT NEOPLASM OF BREAST IN FEMALE, ESTROGEN RECEPTOR POSITIVE, UNSPECIFIED LATERALITY, UNSPECIFIED SITE OF BREAST: Primary | ICD-10-CM

## 2025-05-19 ENCOUNTER — LAB (OUTPATIENT)
Dept: LAB | Facility: CLINIC | Age: 65
End: 2025-05-19
Payer: MEDICARE

## 2025-05-19 ENCOUNTER — OFFICE VISIT (OUTPATIENT)
Dept: HEMATOLOGY/ONCOLOGY | Facility: CLINIC | Age: 65
End: 2025-05-19
Payer: MEDICARE

## 2025-05-19 VITALS
DIASTOLIC BLOOD PRESSURE: 72 MMHG | WEIGHT: 134.81 LBS | RESPIRATION RATE: 16 BRPM | OXYGEN SATURATION: 97 % | TEMPERATURE: 97.3 F | SYSTOLIC BLOOD PRESSURE: 108 MMHG | HEART RATE: 70 BPM | BODY MASS INDEX: 21.76 KG/M2

## 2025-05-19 DIAGNOSIS — Z17.0 MALIGNANT NEOPLASM OF RIGHT BREAST IN FEMALE, ESTROGEN RECEPTOR POSITIVE, UNSPECIFIED SITE OF BREAST: Primary | ICD-10-CM

## 2025-05-19 DIAGNOSIS — Z85.3 HISTORY OF MALIGNANT NEOPLASM OF BREAST: ICD-10-CM

## 2025-05-19 DIAGNOSIS — Z17.0 MALIGNANT NEOPLASM OF RIGHT BREAST IN FEMALE, ESTROGEN RECEPTOR POSITIVE, UNSPECIFIED SITE OF BREAST: ICD-10-CM

## 2025-05-19 DIAGNOSIS — C50.919 MALIGNANT NEOPLASM OF BREAST IN FEMALE, ESTROGEN RECEPTOR POSITIVE, UNSPECIFIED LATERALITY, UNSPECIFIED SITE OF BREAST: ICD-10-CM

## 2025-05-19 DIAGNOSIS — C50.911 MALIGNANT NEOPLASM OF RIGHT BREAST IN FEMALE, ESTROGEN RECEPTOR POSITIVE, UNSPECIFIED SITE OF BREAST: Primary | ICD-10-CM

## 2025-05-19 DIAGNOSIS — C50.911 MALIGNANT NEOPLASM OF RIGHT BREAST IN FEMALE, ESTROGEN RECEPTOR POSITIVE, UNSPECIFIED SITE OF BREAST: ICD-10-CM

## 2025-05-19 DIAGNOSIS — Z17.0 MALIGNANT NEOPLASM OF BREAST IN FEMALE, ESTROGEN RECEPTOR POSITIVE, UNSPECIFIED LATERALITY, UNSPECIFIED SITE OF BREAST: ICD-10-CM

## 2025-05-19 LAB
BASOPHILS # BLD AUTO: 0.04 X10*3/UL (ref 0–0.1)
BASOPHILS NFR BLD AUTO: 0.7 %
EOSINOPHIL # BLD AUTO: 0.06 X10*3/UL (ref 0–0.7)
EOSINOPHIL NFR BLD AUTO: 1 %
ERYTHROCYTE [DISTWIDTH] IN BLOOD BY AUTOMATED COUNT: 12.7 % (ref 11.5–14.5)
HCT VFR BLD AUTO: 42.6 % (ref 36–46)
HGB BLD-MCNC: 14.6 G/DL (ref 12–16)
IMM GRANULOCYTES # BLD AUTO: 0 X10*3/UL (ref 0–0.7)
IMM GRANULOCYTES NFR BLD AUTO: 0 % (ref 0–0.9)
LYMPHOCYTES # BLD AUTO: 1.3 X10*3/UL (ref 1.2–4.8)
LYMPHOCYTES NFR BLD AUTO: 21.5 %
MCH RBC QN AUTO: 33.2 PG (ref 26–34)
MCHC RBC AUTO-ENTMCNC: 34.3 G/DL (ref 32–36)
MCV RBC AUTO: 97 FL (ref 80–100)
MONOCYTES # BLD AUTO: 0.34 X10*3/UL (ref 0.1–1)
MONOCYTES NFR BLD AUTO: 5.6 %
NEUTROPHILS # BLD AUTO: 4.32 X10*3/UL (ref 1.2–7.7)
NEUTROPHILS NFR BLD AUTO: 71.2 %
NRBC BLD-RTO: NORMAL /100{WBCS}
PLATELET # BLD AUTO: 225 X10*3/UL (ref 150–450)
RBC # BLD AUTO: 4.4 X10*6/UL (ref 4–5.2)
WBC # BLD AUTO: 6.1 X10*3/UL (ref 4.4–11.3)

## 2025-05-19 PROCEDURE — 83001 ASSAY OF GONADOTROPIN (FSH): CPT

## 2025-05-19 PROCEDURE — 1159F MED LIST DOCD IN RCRD: CPT | Performed by: STUDENT IN AN ORGANIZED HEALTH CARE EDUCATION/TRAINING PROGRAM

## 2025-05-19 PROCEDURE — 82670 ASSAY OF TOTAL ESTRADIOL: CPT

## 2025-05-19 PROCEDURE — 99215 OFFICE O/P EST HI 40 MIN: CPT | Performed by: STUDENT IN AN ORGANIZED HEALTH CARE EDUCATION/TRAINING PROGRAM

## 2025-05-19 PROCEDURE — 83002 ASSAY OF GONADOTROPIN (LH): CPT

## 2025-05-19 PROCEDURE — 1126F AMNT PAIN NOTED NONE PRSNT: CPT | Performed by: STUDENT IN AN ORGANIZED HEALTH CARE EDUCATION/TRAINING PROGRAM

## 2025-05-19 PROCEDURE — 84100 ASSAY OF PHOSPHORUS: CPT

## 2025-05-19 PROCEDURE — 36415 COLL VENOUS BLD VENIPUNCTURE: CPT

## 2025-05-19 PROCEDURE — 80053 COMPREHEN METABOLIC PANEL: CPT

## 2025-05-19 PROCEDURE — 85025 COMPLETE CBC W/AUTO DIFF WBC: CPT

## 2025-05-19 PROCEDURE — 83735 ASSAY OF MAGNESIUM: CPT

## 2025-05-19 RX ORDER — ATENOLOL 50 MG/1
50 TABLET ORAL DAILY
COMMUNITY

## 2025-05-19 ASSESSMENT — PAIN SCALES - GENERAL: PAINLEVEL_OUTOF10: 0-NO PAIN

## 2025-05-19 NOTE — PROGRESS NOTES
Research Note Treatment Day    Eloina Mejia is here today for treatment on [id number]. Today is C33D1. Procedures completed per protocol. AE's and con-meds reviewed with patient. Patient is feeling good. She states since her last visit her cardiologist started her on Atenolol for tachycardia s/p cardiac ablation. ECOG 0. Pill diary collected and pill bottles returned to [facility] pharmacy. New pill diary. Ok for next cycle of treatment per Dr. Kamara. Giredestrant double checked with Manuel Mckenna, pharmacist. Dispensed to patient. Otherwise there have been no changes. Patient is aware of treatment plan. Dr. Kamara will not available for patient's next scheduled visit, will schedule patient at [facility] with Key Moreno NP, who has seen patient previously. Patient is agreeable to plan. Will RTC in 3 months per protocol calendar. Patient has research coordinator's contact information and instructed to call prior to appointment with any questions or concerns. She verbalized understanding.     [x]   Received treatment as planned     Education Documentation  Chemotherapy Safety at Home, taught by Mark Delacruz RN at 5/19/2025 11:20 AM.  Learner: Significant Other, Patient  Readiness: Eager  Method: Explanation, Handout  Response: Verbalizes Understanding    Treatment Plan and Schedule, taught by Mark Delacruz RN at 5/19/2025 11:20 AM.  Learner: Significant Other, Patient  Readiness: Eager  Method: Explanation, Handout  Response: Verbalizes Understanding    Education Comments  No comments found.

## 2025-05-19 NOTE — PROGRESS NOTES
[facility]  Location: [place]    Visit Type: Follow-up Visit    Oncologic History:    3/4/21: Screening Mammo: extremely dense tissue. Right breast calcifications.    3/30/21: Diagnostic B breast Continue yearly mammogram.: large marina like calcifications in lower inner quadrant of right breast.    9/30/21: R breast diagnostic Continue yearly mammogram.: Stable R breast calcifications   4/12/22: Bilateral diagnostic mammogram with targeted right breast US: R breast at 6:00 position 2 CFN, there is a hypoechoic mass, measuring 1.5 X 0.7 X2.5 cm corresponding to mammogram findings. Scanning of right axilla demonstrates no adenopathy. No  evidence of malignancy in the left breast.    4/21/22: R breast US: guided core needle biopsy:    4/28.22:  review of outside images: biopsy proven malignancy at the 6:00 position of the R breast spanning approximately 2.5 cm.    5/20/22: R breast upper inner quadrant MRI guided biopsy    6/8/22: R breast total mastectomy with axillary sentinel LN biopsy. IDC with DCIS. 1.4 cm. 4 negative LN. G3. LVI present. negative margins. pT1cN0. ER positive % AL positive 81-90% HER2 negative. MammaPrint High risk, luminal B type.  -0.686.    8/2/22- 10/4/22: Adjuvant TC X4   11/2022: Enrolled on NSABP B61 trial- randomized to giradestrant arm    Subjective: Interval History  History of Present Illness    Patient presents today for follow-up visit.  She is accompanied by her .      She reports a general sense of well-being, with no new health concerns. Mild muscle/joint soreness is experienced, which she attributes to her age. After walking this morning, she felt a bit sore but considers it manageable. No new aches or pains are reported. There have been no changes in her breast or chest wall, and her weight has remained stable. Her appetite and energy levels are consistent.    She underwent an ablation procedure for atrial fibrillation at the end of  2025. Approximately 3 to 4 weeks post-procedure, an elevated heart rate was noted, leading to the initiation of atenolol therapy. Her heart rate has since stabilized. It was communicated that it would take approximately 3 months for her heart to fully recover from the procedure. She was advised to continue atenolol for a period of 3 months, after which her condition would be reassessed.    She will receive her last Zometa infusion in 2025.      Gynecologic History:     Menarche 11  Menopause 52  , 25 years old first delivery  Did not BF  No OCP or HRT  Used Clomid     Pertinent Family history:    No family history of breast or ovarian cancer  Sister had uterine and cervical cancer  Mother with lung cancer    Social History  Eloina Mejia  reports that she has never smoked. She has never used smokeless tobacco.  She  reports current alcohol use.  She  reports no history of drug use.    ROS:     Review of Systems   All other systems reviewed and are negative.       Physical Examination:    /72   Pulse 70   Temp 36.3 °C (97.3 °F)   Resp 16   Wt 61.1 kg (134 lb 13 oz)   SpO2 97%   BMI 21.76 kg/m²     Physical Exam  Vitals and nursing note reviewed.   Constitutional:       General: She is not in acute distress.     Appearance: Normal appearance. She is not toxic-appearing.   HENT:      Head: Normocephalic and atraumatic.      Mouth/Throat:      Pharynx: Oropharynx is clear.   Eyes:      Extraocular Movements: Extraocular movements intact.      Conjunctiva/sclera: Conjunctivae normal.   Cardiovascular:      Rate and Rhythm: Normal rate and regular rhythm.   Pulmonary:      Effort: Pulmonary effort is normal. No respiratory distress.   Abdominal:      General: Abdomen is flat. Bowel sounds are normal.      Palpations: Abdomen is soft.   Musculoskeletal:         General: No swelling. Normal range of motion.      Cervical back: Normal range of motion.   Skin:     General: Skin is warm and dry.    Neurological:      General: No focal deficit present.      Mental Status: She is alert.   Psychiatric:         Mood and Affect: Mood normal.       Breast Examination:    R chest wall without masses/lesions.   L breast is unremarkable.  No skin masses or nipple changes    ECOG Performance Status:     [x] 0 Fully active, able to carry on all pre-disease performance without restriction  [] 1 Restricted in physically strenuous activity but ambulatory and able to carry out work of a light or sedentary nature, e.g., light house work, office work  [] 2 Ambulatory and capable of all selfcare but unable to carry out any work activities; up and about more than 50% of waking hours  [] 3 Capable of only limited selfcare; confined to bed or chair more than 50% of waking hours  [] 4 Completely disabled; cannot carry on any selfcare; totally confined to bed or chair  [] 5 Dead     Results:    Labs:  Reviewed    Imaging:  Reviewed    Pathology:  Reviewed    Assessment:     Pathologic prognostic stage IA (pT1c pN0 cM0) invasive ductal carcinoma of the R breast; Dx in April 2022; Grade 3; ER positive (%), WV positive (81-90%),  HER2 negative; High risk MammaPrint; S/p R mastectomy and SLNbx. S/p adjuvant TCX4. On giredestrant on NSABP B61 trial.     Eloina is a very pleasant post-menopausal woman with other history of HTN and IBS. Tolerating treatment well. No evidence of breast cancer recurrence per patient history, physical examination and imaging findings.     Plan:    Surgical Plan: S/p R mastectomy and SLNbx  Additional biopsy: Not indicated  Radiation Plan: Not indicated  Additional staging scans/DEXA/echo: Staging scans not indicated based on current stage, patient history and physical examination.   Additional Path info (i.e Ki67, PDL1): Not indicated  Gene assays: MammaPrint high risk     Systemic treatment plan: Giredestrant                Intent: Curative             Clinical trial: Enrolled on [id number]: “A Phase III,  Randomized, Open-Label, Multicenter Study Evaluating The Efficacy And Safety  Of Adjuvant Giredestrant Compared With Physician's Choice Of Adjuvant Endocrine Monotherapy In Patients With Estrogen Receptor-Positive, Her2- Negative Early Breast Cancer”                 Endocrine therapy: Trial with Giredestrant                HER2 treatment: Not indicated                Targeted agents: Not indicated                Chemotherapy: TC x4 completed 10/4/22                BMA: Receiving zometa every 6 months for 3 years.      Access: Not indicated  Supportive meds: not indicated  Genetic testing: Not indicated  Fertility preservation: Not indicated  Other active problems/orders:      Osteoporosis: On zometa - general recommendations for bone loss prevention which include [id number] mg of calcium intake per day for adults  >50yrs, and no history of renal calculi; 800-1000 IU of vitamin D3 per day for adults >50yrs, and no history of renal calculi. Weight bearing exercise. Discontinue smoking. Avoid excessive use of caffeine, soft drinks, and alcoholic beverages. In addition,  balance training and fall prevention programs can help reduce the risk of fractures. Next DEXA due in 11/2026     Surveillance plan: Yearly mammogram of L breast arranged by surg-on team.     Follow-up: C6 of 6 Zometa due around 8/2025; Follow-up per trial protocol.    Patient expressed understanding of the plan outlined above. She had ample time to ask questions. She understands she can contact us should she have additional questions or issues arise in the interim.

## 2025-05-20 LAB
ALBUMIN SERPL BCP-MCNC: 4.8 G/DL (ref 3.4–5)
ALP SERPL-CCNC: 63 U/L (ref 33–136)
ALT SERPL W P-5'-P-CCNC: 17 U/L (ref 7–45)
ANION GAP SERPL CALC-SCNC: 13 MMOL/L (ref 10–20)
AST SERPL W P-5'-P-CCNC: 17 U/L (ref 9–39)
BILIRUB SERPL-MCNC: 0.6 MG/DL (ref 0–1.2)
BUN SERPL-MCNC: 15 MG/DL (ref 6–23)
CALCIUM SERPL-MCNC: 10 MG/DL (ref 8.6–10.6)
CHLORIDE SERPL-SCNC: 99 MMOL/L (ref 98–107)
CO2 SERPL-SCNC: 29 MMOL/L (ref 21–32)
CREAT SERPL-MCNC: 0.54 MG/DL (ref 0.5–1.05)
EGFRCR SERPLBLD CKD-EPI 2021: >90 ML/MIN/1.73M*2
ESTRADIOL SERPL-MCNC: 27 PG/ML
FSH SERPL-ACNC: 43.5 IU/L
GLUCOSE SERPL-MCNC: 83 MG/DL (ref 74–99)
LH SERPL-ACNC: 19.6 IU/L
MAGNESIUM SERPL-MCNC: 2.2 MG/DL (ref 1.6–2.4)
PHOSPHATE SERPL-MCNC: 3.4 MG/DL (ref 2.5–4.9)
POTASSIUM SERPL-SCNC: 4.3 MMOL/L (ref 3.5–5.3)
PROT SERPL-MCNC: 7.3 G/DL (ref 6.4–8.2)
SODIUM SERPL-SCNC: 137 MMOL/L (ref 136–145)

## 2025-05-26 ENCOUNTER — APPOINTMENT (OUTPATIENT)
Dept: HEMATOLOGY/ONCOLOGY | Facility: CLINIC | Age: 65
End: 2025-05-26
Payer: MEDICARE

## 2025-07-28 DIAGNOSIS — N64.89 OTHER SPECIFIED DISORDERS OF BREAST: ICD-10-CM

## 2025-07-28 DIAGNOSIS — Z17.0 MALIGNANT NEOPLASM OF RIGHT BREAST IN FEMALE, ESTROGEN RECEPTOR POSITIVE, UNSPECIFIED SITE OF BREAST: ICD-10-CM

## 2025-07-28 DIAGNOSIS — C50.911 MALIGNANT NEOPLASM OF RIGHT BREAST IN FEMALE, ESTROGEN RECEPTOR POSITIVE, UNSPECIFIED SITE OF BREAST: ICD-10-CM

## 2025-08-06 ENCOUNTER — LAB (OUTPATIENT)
Dept: LAB | Facility: CLINIC | Age: 65
End: 2025-08-06
Payer: MEDICARE

## 2025-08-06 DIAGNOSIS — Z85.3 HISTORY OF MALIGNANT NEOPLASM OF BREAST: ICD-10-CM

## 2025-08-06 DIAGNOSIS — C50.919 MALIGNANT NEOPLASM OF BREAST IN FEMALE, ESTROGEN RECEPTOR POSITIVE, UNSPECIFIED LATERALITY, UNSPECIFIED SITE OF BREAST: ICD-10-CM

## 2025-08-06 DIAGNOSIS — Z17.0 MALIGNANT NEOPLASM OF BREAST IN FEMALE, ESTROGEN RECEPTOR POSITIVE, UNSPECIFIED LATERALITY, UNSPECIFIED SITE OF BREAST: ICD-10-CM

## 2025-08-06 LAB
ALBUMIN SERPL BCP-MCNC: 4.4 G/DL (ref 3.4–5)
ALP SERPL-CCNC: 63 U/L (ref 33–136)
ALT SERPL W P-5'-P-CCNC: 15 U/L (ref 7–45)
ANION GAP SERPL CALC-SCNC: 10 MMOL/L (ref 10–20)
APTT PPP: 30 SECONDS (ref 26–36)
AST SERPL W P-5'-P-CCNC: 17 U/L (ref 9–39)
BASOPHILS # BLD AUTO: 0.02 X10*3/UL (ref 0–0.1)
BASOPHILS NFR BLD AUTO: 0.5 %
BILIRUB SERPL-MCNC: 0.7 MG/DL (ref 0–1.2)
BUN SERPL-MCNC: 11 MG/DL (ref 6–23)
CALCIUM SERPL-MCNC: 9.6 MG/DL (ref 8.6–10.3)
CHLORIDE SERPL-SCNC: 103 MMOL/L (ref 98–107)
CHOLEST SERPL-MCNC: 193 MG/DL (ref 0–199)
CHOLESTEROL/HDL RATIO: 2.8
CO2 SERPL-SCNC: 30 MMOL/L (ref 21–32)
CREAT SERPL-MCNC: 0.62 MG/DL (ref 0.5–1.05)
EGFRCR SERPLBLD CKD-EPI 2021: >90 ML/MIN/1.73M*2
EOSINOPHIL # BLD AUTO: 0.09 X10*3/UL (ref 0–0.7)
EOSINOPHIL NFR BLD AUTO: 2.3 %
ERYTHROCYTE [DISTWIDTH] IN BLOOD BY AUTOMATED COUNT: 12.6 % (ref 11.5–14.5)
GLUCOSE SERPL-MCNC: 90 MG/DL (ref 74–99)
HCT VFR BLD AUTO: 41.4 % (ref 36–46)
HDLC SERPL-MCNC: 68.7 MG/DL
HGB BLD-MCNC: 13.7 G/DL (ref 12–16)
IMM GRANULOCYTES # BLD AUTO: 0.01 X10*3/UL (ref 0–0.7)
IMM GRANULOCYTES NFR BLD AUTO: 0.3 % (ref 0–0.9)
INR PPP: 1 (ref 0.9–1.1)
LDLC SERPL CALC-MCNC: 105 MG/DL
LYMPHOCYTES # BLD AUTO: 1.07 X10*3/UL (ref 1.2–4.8)
LYMPHOCYTES NFR BLD AUTO: 26.8 %
MCH RBC QN AUTO: 33.3 PG (ref 26–34)
MCHC RBC AUTO-ENTMCNC: 33.1 G/DL (ref 32–36)
MCV RBC AUTO: 101 FL (ref 80–100)
MONOCYTES # BLD AUTO: 0.29 X10*3/UL (ref 0.1–1)
MONOCYTES NFR BLD AUTO: 7.3 %
NEUTROPHILS # BLD AUTO: 2.52 X10*3/UL (ref 1.2–7.7)
NEUTROPHILS NFR BLD AUTO: 62.8 %
NON HDL CHOLESTEROL: 124 MG/DL (ref 0–149)
NRBC BLD-RTO: 0 /100 WBCS (ref 0–0)
PLATELET # BLD AUTO: 192 X10*3/UL (ref 150–450)
POTASSIUM SERPL-SCNC: 4.1 MMOL/L (ref 3.5–5.3)
PROT SERPL-MCNC: 6.8 G/DL (ref 6.4–8.2)
PROTHROMBIN TIME: 10.9 SECONDS (ref 9.8–12.4)
RBC # BLD AUTO: 4.12 X10*6/UL (ref 4–5.2)
SODIUM SERPL-SCNC: 139 MMOL/L (ref 136–145)
TRIGL SERPL-MCNC: 95 MG/DL (ref 0–149)
VLDL: 19 MG/DL (ref 0–40)
WBC # BLD AUTO: 4 X10*3/UL (ref 4.4–11.3)

## 2025-08-06 PROCEDURE — 80053 COMPREHEN METABOLIC PANEL: CPT

## 2025-08-06 PROCEDURE — 85025 COMPLETE CBC W/AUTO DIFF WBC: CPT

## 2025-08-06 PROCEDURE — 80061 LIPID PANEL: CPT

## 2025-08-06 PROCEDURE — 85610 PROTHROMBIN TIME: CPT

## 2025-08-06 PROCEDURE — 36415 COLL VENOUS BLD VENIPUNCTURE: CPT

## 2025-08-06 PROCEDURE — 85730 THROMBOPLASTIN TIME PARTIAL: CPT

## 2025-08-10 PROBLEM — Z78.0 MENOPAUSE: Status: ACTIVE | Noted: 2025-08-10

## 2025-08-10 PROBLEM — M85.80 OSTEOPENIA: Status: RESOLVED | Noted: 2025-08-10 | Resolved: 2025-08-10

## 2025-08-10 PROBLEM — M85.80 OSTEOPENIA: Status: ACTIVE | Noted: 2025-08-10

## 2025-08-11 ENCOUNTER — EDUCATION (OUTPATIENT)
Dept: HEMATOLOGY/ONCOLOGY | Facility: HOSPITAL | Age: 65
End: 2025-08-11
Payer: MEDICARE

## 2025-08-11 ENCOUNTER — OFFICE VISIT (OUTPATIENT)
Dept: HEMATOLOGY/ONCOLOGY | Facility: HOSPITAL | Age: 65
End: 2025-08-11
Payer: MEDICARE

## 2025-08-11 ENCOUNTER — EDUCATION (OUTPATIENT)
Dept: HEMATOLOGY/ONCOLOGY | Facility: HOSPITAL | Age: 65
End: 2025-08-11

## 2025-08-11 ENCOUNTER — INFUSION (OUTPATIENT)
Dept: HEMATOLOGY/ONCOLOGY | Facility: HOSPITAL | Age: 65
End: 2025-08-11
Payer: MEDICARE

## 2025-08-11 VITALS
TEMPERATURE: 96.4 F | SYSTOLIC BLOOD PRESSURE: 122 MMHG | DIASTOLIC BLOOD PRESSURE: 81 MMHG | HEART RATE: 82 BPM | OXYGEN SATURATION: 98 % | WEIGHT: 134.7 LBS | BODY MASS INDEX: 21.74 KG/M2 | RESPIRATION RATE: 20 BRPM

## 2025-08-11 DIAGNOSIS — Z08 ENCOUNTER FOR FOLLOW-UP SURVEILLANCE OF BREAST CANCER: ICD-10-CM

## 2025-08-11 DIAGNOSIS — C50.911 MALIGNANT NEOPLASM OF RIGHT BREAST IN FEMALE, ESTROGEN RECEPTOR POSITIVE, UNSPECIFIED SITE OF BREAST: ICD-10-CM

## 2025-08-11 DIAGNOSIS — Z78.0 MENOPAUSE: ICD-10-CM

## 2025-08-11 DIAGNOSIS — Z17.0 MALIGNANT NEOPLASM OF RIGHT BREAST IN FEMALE, ESTROGEN RECEPTOR POSITIVE, UNSPECIFIED SITE OF BREAST: ICD-10-CM

## 2025-08-11 DIAGNOSIS — C50.919 MALIGNANT NEOPLASM OF FEMALE BREAST, UNSPECIFIED ESTROGEN RECEPTOR STATUS, UNSPECIFIED LATERALITY, UNSPECIFIED SITE OF BREAST: ICD-10-CM

## 2025-08-11 DIAGNOSIS — Z17.0 MALIGNANT NEOPLASM OF BREAST IN FEMALE, ESTROGEN RECEPTOR POSITIVE, UNSPECIFIED LATERALITY, UNSPECIFIED SITE OF BREAST: ICD-10-CM

## 2025-08-11 DIAGNOSIS — M85.80 OSTEOPENIA, UNSPECIFIED LOCATION: ICD-10-CM

## 2025-08-11 DIAGNOSIS — Z85.3 ENCOUNTER FOR FOLLOW-UP SURVEILLANCE OF BREAST CANCER: ICD-10-CM

## 2025-08-11 DIAGNOSIS — Z00.6 EXAMINATION OF PARTICIPANT IN CLINICAL TRIAL: ICD-10-CM

## 2025-08-11 DIAGNOSIS — C50.919 MALIGNANT NEOPLASM OF BREAST IN FEMALE, ESTROGEN RECEPTOR POSITIVE, UNSPECIFIED LATERALITY, UNSPECIFIED SITE OF BREAST: ICD-10-CM

## 2025-08-11 PROCEDURE — 1159F MED LIST DOCD IN RCRD: CPT | Performed by: NURSE PRACTITIONER

## 2025-08-11 PROCEDURE — 2500000004 HC RX 250 GENERAL PHARMACY W/ HCPCS (ALT 636 FOR OP/ED): Performed by: NURSE PRACTITIONER

## 2025-08-11 PROCEDURE — 1126F AMNT PAIN NOTED NONE PRSNT: CPT | Performed by: NURSE PRACTITIONER

## 2025-08-11 PROCEDURE — 1160F RVW MEDS BY RX/DR IN RCRD: CPT | Performed by: NURSE PRACTITIONER

## 2025-08-11 PROCEDURE — 1036F TOBACCO NON-USER: CPT | Performed by: NURSE PRACTITIONER

## 2025-08-11 PROCEDURE — 99214 OFFICE O/P EST MOD 30 MIN: CPT | Performed by: NURSE PRACTITIONER

## 2025-08-11 PROCEDURE — 96365 THER/PROPH/DIAG IV INF INIT: CPT | Mod: INF

## 2025-08-11 RX ORDER — DIPHENHYDRAMINE HYDROCHLORIDE 50 MG/ML
50 INJECTION, SOLUTION INTRAMUSCULAR; INTRAVENOUS AS NEEDED
OUTPATIENT
Start: 2025-11-03

## 2025-08-11 RX ORDER — ALBUTEROL SULFATE 0.83 MG/ML
3 SOLUTION RESPIRATORY (INHALATION) AS NEEDED
OUTPATIENT
Start: 2025-11-03

## 2025-08-11 RX ORDER — EPINEPHRINE 0.3 MG/.3ML
0.3 INJECTION SUBCUTANEOUS EVERY 5 MIN PRN
OUTPATIENT
Start: 2025-11-03

## 2025-08-11 RX ORDER — DIPHENHYDRAMINE HYDROCHLORIDE 50 MG/ML
50 INJECTION, SOLUTION INTRAMUSCULAR; INTRAVENOUS AS NEEDED
Status: CANCELLED | OUTPATIENT
Start: 2025-08-11

## 2025-08-11 RX ORDER — EPINEPHRINE 0.3 MG/.3ML
0.3 INJECTION SUBCUTANEOUS EVERY 5 MIN PRN
Status: CANCELLED | OUTPATIENT
Start: 2025-08-11

## 2025-08-11 RX ORDER — HEPARIN 100 UNIT/ML
500 SYRINGE INTRAVENOUS AS NEEDED
OUTPATIENT
Start: 2025-08-11

## 2025-08-11 RX ORDER — ALBUTEROL SULFATE 0.83 MG/ML
3 SOLUTION RESPIRATORY (INHALATION) AS NEEDED
Status: CANCELLED | OUTPATIENT
Start: 2025-08-11

## 2025-08-11 RX ORDER — ZOLEDRONIC ACID 0.04 MG/ML
4 INJECTION, SOLUTION INTRAVENOUS ONCE
OUTPATIENT
Start: 2025-11-03

## 2025-08-11 RX ORDER — FAMOTIDINE 10 MG/ML
20 INJECTION, SOLUTION INTRAVENOUS ONCE AS NEEDED
OUTPATIENT
Start: 2025-11-03

## 2025-08-11 RX ORDER — ZOLEDRONIC ACID 0.04 MG/ML
4 INJECTION, SOLUTION INTRAVENOUS ONCE
Status: COMPLETED | OUTPATIENT
Start: 2025-08-11 | End: 2025-08-11

## 2025-08-11 RX ORDER — FAMOTIDINE 10 MG/ML
20 INJECTION, SOLUTION INTRAVENOUS ONCE AS NEEDED
Status: CANCELLED | OUTPATIENT
Start: 2025-08-11

## 2025-08-11 RX ORDER — HEPARIN SODIUM,PORCINE/PF 10 UNIT/ML
50 SYRINGE (ML) INTRAVENOUS AS NEEDED
OUTPATIENT
Start: 2025-08-11

## 2025-08-11 RX ADMIN — SODIUM CHLORIDE 500 ML: 0.9 INJECTION, SOLUTION INTRAVENOUS at 14:40

## 2025-08-11 RX ADMIN — ZOLEDRONIC ACID 4 MG: 0.04 INJECTION, SOLUTION INTRAVENOUS at 15:10

## 2025-08-11 ASSESSMENT — PAIN SCALES - GENERAL: PAINLEVEL_OUTOF10: 0-NO PAIN

## 2025-08-15 ENCOUNTER — HOSPITAL ENCOUNTER (OUTPATIENT)
Dept: CARDIOLOGY | Facility: HOSPITAL | Age: 65
Discharge: HOME | End: 2025-08-15
Payer: MEDICARE

## 2025-08-15 DIAGNOSIS — Z17.0 MALIGNANT NEOPLASM OF RIGHT BREAST IN FEMALE, ESTROGEN RECEPTOR POSITIVE, UNSPECIFIED SITE OF BREAST: ICD-10-CM

## 2025-08-15 DIAGNOSIS — C50.911 MALIGNANT NEOPLASM OF RIGHT BREAST IN FEMALE, ESTROGEN RECEPTOR POSITIVE, UNSPECIFIED SITE OF BREAST: ICD-10-CM

## 2025-08-15 LAB
ATRIAL RATE: 74 BPM
P AXIS: 60 DEGREES
P OFFSET: 203 MS
P ONSET: 158 MS
PR INTERVAL: 130 MS
Q ONSET: 223 MS
QRS COUNT: 12 BEATS
QRS DURATION: 92 MS
QT INTERVAL: 394 MS
QTC CALCULATION(BAZETT): 437 MS
QTC FREDERICIA: 422 MS
R AXIS: 25 DEGREES
T AXIS: 28 DEGREES
T OFFSET: 420 MS
VENTRICULAR RATE: 74 BPM

## 2025-08-15 PROCEDURE — 93005 ELECTROCARDIOGRAM TRACING: CPT

## 2025-09-05 ENCOUNTER — HOSPITAL ENCOUNTER (EMERGENCY)
Facility: HOSPITAL | Age: 65
Discharge: HOME | End: 2025-09-05
Attending: GENERAL PRACTICE
Payer: MEDICARE

## 2025-09-05 VITALS
DIASTOLIC BLOOD PRESSURE: 93 MMHG | OXYGEN SATURATION: 98 % | HEART RATE: 81 BPM | BODY MASS INDEX: 21.53 KG/M2 | HEIGHT: 66 IN | RESPIRATION RATE: 16 BRPM | WEIGHT: 134 LBS | SYSTOLIC BLOOD PRESSURE: 194 MMHG | TEMPERATURE: 97.7 F
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
## 2025-09-05 PROCEDURE — 2500000004 HC RX 250 GENERAL PHARMACY W/ HCPCS (ALT 636 FOR OP/ED): Performed by: GENERAL PRACTICE

## 2025-09-05 PROCEDURE — 2500000001 HC RX 250 WO HCPCS SELF ADMINISTERED DRUGS (ALT 637 FOR MEDICARE OP): Performed by: GENERAL PRACTICE

## 2025-09-05 PROCEDURE — 99283 EMERGENCY DEPT VISIT LOW MDM: CPT | Performed by: GENERAL PRACTICE

## 2025-09-05 RX ORDER — AMOXICILLIN AND CLAVULANATE POTASSIUM 875; 125 MG/1; MG/1
1 TABLET, FILM COATED ORAL ONCE
Status: COMPLETED | OUTPATIENT
Start: 2025-09-05 | End: 2025-09-05

## 2025-09-05 RX ORDER — LIDOCAINE HYDROCHLORIDE 10 MG/ML
10 INJECTION, SOLUTION INFILTRATION; PERINEURAL ONCE
Status: COMPLETED | OUTPATIENT
Start: 2025-09-05 | End: 2025-09-05

## 2025-09-05 RX ORDER — AMOXICILLIN AND CLAVULANATE POTASSIUM 875; 125 MG/1; MG/1
1 TABLET, FILM COATED ORAL EVERY 12 HOURS
Qty: 14 TABLET | Refills: 0 | Status: SHIPPED | OUTPATIENT
Start: 2025-09-05 | End: 2025-09-12

## 2025-09-05 RX ADMIN — AMOXICILLIN AND CLAVULANATE POTASSIUM 1 TABLET: 875; 125 TABLET, FILM COATED ORAL at 22:05

## 2025-09-05 RX ADMIN — LIDOCAINE HYDROCHLORIDE 10 ML: 10 INJECTION, SOLUTION INFILTRATION; PERINEURAL at 22:13

## 2025-09-05 ASSESSMENT — PAIN DESCRIPTION - DESCRIPTORS: DESCRIPTORS: SHARP

## 2025-09-05 ASSESSMENT — PAIN SCALES - GENERAL: PAINLEVEL_OUTOF10: 9

## 2025-09-05 ASSESSMENT — PAIN DESCRIPTION - LOCATION: LOCATION: ANKLE

## 2025-09-05 ASSESSMENT — PAIN DESCRIPTION - ORIENTATION: ORIENTATION: LEFT

## 2025-09-05 ASSESSMENT — PAIN DESCRIPTION - PAIN TYPE: TYPE: ACUTE PAIN

## 2025-09-05 ASSESSMENT — PAIN - FUNCTIONAL ASSESSMENT: PAIN_FUNCTIONAL_ASSESSMENT: 0-10

## 2025-11-10 ENCOUNTER — APPOINTMENT (OUTPATIENT)
Dept: HEMATOLOGY/ONCOLOGY | Facility: CLINIC | Age: 65
End: 2025-11-10
Payer: MEDICARE

## 2025-11-24 ENCOUNTER — APPOINTMENT (OUTPATIENT)
Dept: HEMATOLOGY/ONCOLOGY | Facility: CLINIC | Age: 65
End: 2025-11-24
Payer: MEDICARE

## 2025-12-22 ENCOUNTER — APPOINTMENT (OUTPATIENT)
Dept: HEMATOLOGY/ONCOLOGY | Facility: CLINIC | Age: 65
End: 2025-12-22
Payer: MEDICARE

## 2026-01-14 ENCOUNTER — APPOINTMENT (OUTPATIENT)
Dept: RADIOLOGY | Facility: CLINIC | Age: 66
End: 2026-01-14
Payer: MEDICARE